# Patient Record
Sex: FEMALE | Race: WHITE | NOT HISPANIC OR LATINO | Employment: FULL TIME
[De-identification: names, ages, dates, MRNs, and addresses within clinical notes are randomized per-mention and may not be internally consistent; named-entity substitution may affect disease eponyms.]

---

## 2018-01-05 ENCOUNTER — RECORDS - HEALTHEAST (OUTPATIENT)
Dept: ADMINISTRATIVE | Facility: OTHER | Age: 33
End: 2018-01-05

## 2018-04-05 ENCOUNTER — RECORDS - HEALTHEAST (OUTPATIENT)
Dept: LAB | Facility: CLINIC | Age: 33
End: 2018-04-05

## 2018-04-05 LAB — HIV 1+2 AB+HIV1 P24 AG SERPL QL IA: NEGATIVE

## 2018-04-06 LAB
HBV SURFACE AG SERPL QL IA: NEGATIVE
HCV AB SERPL QL IA: NEGATIVE
HEPATITIS B SURFACE ANTIBODY LHE- HISTORICAL: POSITIVE

## 2018-05-29 ENCOUNTER — RECORDS - HEALTHEAST (OUTPATIENT)
Dept: ADMINISTRATIVE | Facility: OTHER | Age: 33
End: 2018-05-29

## 2018-05-29 ENCOUNTER — AMBULATORY - HEALTHEAST (OUTPATIENT)
Dept: LAB | Facility: CLINIC | Age: 33
End: 2018-05-29

## 2018-05-29 DIAGNOSIS — O99.810 ABNORMAL MATERNAL GLUCOSE TOLERANCE, ANTEPARTUM: ICD-10-CM

## 2018-05-29 LAB
FASTING STATUS PATIENT QL REPORTED: YES
GLUCOSE 1H P 100 G GLC PO SERPL-MCNC: 188 MG/DL (ref 70–179)
GLUCOSE 2H P 100 G GLC PO SERPL-MCNC: 160 MG/DL (ref 70–154)
GLUCOSE 3H P 100 G GLC PO SERPL-MCNC: 87 MG/DL (ref 70–139)
GLUCOSE P FAST SERPL-MCNC: 80 MG/DL (ref 70–94)

## 2018-05-30 ENCOUNTER — RECORDS - HEALTHEAST (OUTPATIENT)
Dept: ADMINISTRATIVE | Facility: OTHER | Age: 33
End: 2018-05-30

## 2018-05-30 ENCOUNTER — COMMUNICATION - HEALTHEAST (OUTPATIENT)
Dept: ADMINISTRATIVE | Facility: CLINIC | Age: 33
End: 2018-05-30

## 2018-06-07 ENCOUNTER — AMBULATORY - HEALTHEAST (OUTPATIENT)
Dept: EDUCATION SERVICES | Facility: CLINIC | Age: 33
End: 2018-06-07

## 2018-06-07 DIAGNOSIS — O24.419 DM (DIABETES MELLITUS), GESTATIONAL, ANTEPARTUM: ICD-10-CM

## 2018-06-07 DIAGNOSIS — O24.419 GESTATIONAL DIABETES MELLITUS (GDM), ANTEPARTUM, GESTATIONAL DIABETES METHOD OF CONTROL UNSPECIFIED: ICD-10-CM

## 2018-06-08 ENCOUNTER — COMMUNICATION - HEALTHEAST (OUTPATIENT)
Dept: ADMINISTRATIVE | Facility: CLINIC | Age: 33
End: 2018-06-08

## 2018-06-08 DIAGNOSIS — O24.419 GESTATIONAL DIABETES MELLITUS (GDM), ANTEPARTUM, GESTATIONAL DIABETES METHOD OF CONTROL UNSPECIFIED: ICD-10-CM

## 2018-06-14 ENCOUNTER — AMBULATORY - HEALTHEAST (OUTPATIENT)
Dept: EDUCATION SERVICES | Facility: CLINIC | Age: 33
End: 2018-06-14

## 2018-06-14 DIAGNOSIS — O24.410 DIET CONTROLLED GESTATIONAL DIABETES MELLITUS (GDM), ANTEPARTUM: ICD-10-CM

## 2018-07-05 ENCOUNTER — SURGERY - HEALTHEAST (OUTPATIENT)
Dept: OBGYN | Facility: HOSPITAL | Age: 33
End: 2018-07-05

## 2018-07-05 ENCOUNTER — AMBULATORY - HEALTHEAST (OUTPATIENT)
Dept: EDUCATION SERVICES | Facility: CLINIC | Age: 33
End: 2018-07-05

## 2018-07-05 ENCOUNTER — ANESTHESIA - HEALTHEAST (OUTPATIENT)
Dept: OBGYN | Facility: HOSPITAL | Age: 33
End: 2018-07-05

## 2018-07-05 DIAGNOSIS — O24.410 DIET CONTROLLED GESTATIONAL DIABETES MELLITUS (GDM), ANTEPARTUM: ICD-10-CM

## 2018-07-05 ASSESSMENT — MIFFLIN-ST. JEOR: SCORE: 1572.25

## 2018-07-06 ENCOUNTER — HOME CARE/HOSPICE - HEALTHEAST (OUTPATIENT)
Dept: HOME HEALTH SERVICES | Facility: HOME HEALTH | Age: 33
End: 2018-07-06

## 2020-01-10 LAB
HBV SURFACE AG SERPL QL IA: NEGATIVE
HIV 1+2 AB+HIV1 P24 AG SERPL QL IA: NEGATIVE
RUBELLA ANTIBODY IGG QUANTITATIVE: NORMAL IU/ML

## 2020-04-02 ENCOUNTER — PRE VISIT (OUTPATIENT)
Dept: MATERNAL FETAL MEDICINE | Facility: CLINIC | Age: 35
End: 2020-04-02

## 2020-04-02 ENCOUNTER — COMMUNICATION - HEALTHEAST (OUTPATIENT)
Dept: MATERNAL FETAL MEDICINE | Facility: HOSPITAL | Age: 35
End: 2020-04-02

## 2020-04-02 ENCOUNTER — TRANSCRIBE ORDERS (OUTPATIENT)
Dept: MATERNAL FETAL MEDICINE | Facility: CLINIC | Age: 35
End: 2020-04-02

## 2020-04-02 ENCOUNTER — MEDICAL CORRESPONDENCE (OUTPATIENT)
Dept: HEALTH INFORMATION MANAGEMENT | Facility: CLINIC | Age: 35
End: 2020-04-02

## 2020-04-02 ENCOUNTER — AMBULATORY - HEALTHEAST (OUTPATIENT)
Dept: MATERNAL FETAL MEDICINE | Facility: HOSPITAL | Age: 35
End: 2020-04-02

## 2020-04-02 DIAGNOSIS — O26.90 PREGNANCY RELATED CONDITION, ANTEPARTUM: Primary | ICD-10-CM

## 2020-04-02 DIAGNOSIS — O26.90 PREGNANCY, ANTEPARTUM, COMPLICATIONS: ICD-10-CM

## 2020-04-03 ENCOUNTER — OFFICE VISIT (OUTPATIENT)
Dept: MATERNAL FETAL MEDICINE | Facility: CLINIC | Age: 35
End: 2020-04-03
Attending: OBSTETRICS & GYNECOLOGY
Payer: COMMERCIAL

## 2020-04-03 ENCOUNTER — HOSPITAL ENCOUNTER (OUTPATIENT)
Dept: ULTRASOUND IMAGING | Facility: CLINIC | Age: 35
End: 2020-04-03
Attending: OBSTETRICS & GYNECOLOGY
Payer: COMMERCIAL

## 2020-04-03 DIAGNOSIS — O28.3 ABNORMAL PRENATAL ULTRASOUND: Primary | ICD-10-CM

## 2020-04-03 DIAGNOSIS — O28.3 ABNORMAL PRENATAL ULTRASOUND: ICD-10-CM

## 2020-04-03 DIAGNOSIS — O26.90 PREGNANCY RELATED CONDITION, ANTEPARTUM: ICD-10-CM

## 2020-04-03 LAB
MISCELLANEOUS TEST: NORMAL
MISCELLANEOUS TEST: NORMAL

## 2020-04-03 PROCEDURE — 40001082 ZZHCL STATISTIC MATERNAL CELL CONTAM MOLEC: Performed by: OBSTETRICS & GYNECOLOGY

## 2020-04-03 PROCEDURE — 87529 HSV DNA AMP PROBE: CPT | Performed by: OBSTETRICS & GYNECOLOGY

## 2020-04-03 PROCEDURE — 76946 ECHO GUIDE FOR AMNIOCENTESIS: CPT | Performed by: OBSTETRICS & GYNECOLOGY

## 2020-04-03 PROCEDURE — 87798 DETECT AGENT NOS DNA AMP: CPT | Mod: XU | Performed by: OBSTETRICS & GYNECOLOGY

## 2020-04-03 PROCEDURE — 76811 OB US DETAILED SNGL FETUS: CPT

## 2020-04-03 PROCEDURE — 96040 ZZH GENETIC COUNSELING, EACH 30 MINUTES: CPT | Mod: ZF | Performed by: GENETIC COUNSELOR, MS

## 2020-04-03 PROCEDURE — 36415 COLL VENOUS BLD VENIPUNCTURE: CPT | Mod: ZF

## 2020-04-03 PROCEDURE — 81265 STR MARKERS SPECIMEN ANAL: CPT | Mod: XU | Performed by: OBSTETRICS & GYNECOLOGY

## 2020-04-03 PROCEDURE — 81229 CYTOG ALYS CHRML ABNR SNPCGH: CPT | Performed by: OBSTETRICS & GYNECOLOGY

## 2020-04-03 PROCEDURE — 84999 UNLISTED CHEMISTRY PROCEDURE: CPT | Performed by: OBSTETRICS & GYNECOLOGY

## 2020-04-03 NOTE — PROGRESS NOTES
Please refer to ultrasound report under 'Imaging' Studies of 'Chart Review' tabs.    Wilain Pathak M.D.

## 2020-04-03 NOTE — PROGRESS NOTES
Pt here for amniocentesis d/t abnormal ultrasound findings. Saw [facility], see their dictation.  After consent signed and TimeOut completed, Dr. Pathak withdrew adequate fluid xone transabdominal pass.    Patient reports no pain.  Pt is RH positive.  MD reviewed blood type and screen and Rhogam not indicated. Maternal cell contamination blood drawn in [facility] clinic.  Discharge teaching completed and questions answered.  Pt discharged ambulatory and stable.   Lab alerted by calling phone number on amnio work-aid for [facility].  Cytogenetics notified of specimen.  Specimen transported to main lab by ALIA Iqbal.  Warm hand-off completed.

## 2020-04-03 NOTE — PROGRESS NOTES
[facility]  Genetic Counseling Consult    Patient: Payal Nunes YOB: 1985   Date of Service:  20      Payal Nunes was seen at the [facility] for genetic consultation given abnormal ultrasound findings.      Impression/Plan:   1. 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  Please see ultrasound for complete details.      2. After today's ultrasound Payal met with genetic counseling to coordinate amniocentesis. The following testing was ordered on the prenatal sample: Fetal Microarray and infection studies (HSV, CMV, Toxo).  Results are expected within 7-10 days, and will be available in [employer]. We will contact her to discuss the results, and a copy will be forwarded to the office of the referring OB provider.  Payal will be contacted at the number she provided, [phone], and requests that detailed results be left in her voicemail if she cannot be reached.     Pregnancy History:   /Parity:                            Age at Delivery: 35 year old  BEN: 2020, by Last Menstrual Period                  Gestational Age: 24w0d  -  No significant complications or exposures were reported in the current pregnancy.    Medical History:   Payal s reported medical history is not expected to impact pregnancy management or risks to fetal development.       Risk Assessment:   We explained that the risk for fetal chromosome abnormalities increases with maternal age. We discussed specific features of common chromosome abnormalities, including Down syndrome, trisomy 13, trisomy 18, and sex chromosome trisomies.      - At age 34 at midtrimester, the risk to have a baby with Down syndrome is 1 in 508.     - At age 34 at midtrimester, the risk to have a baby with any chromosome abnormality is 1 in 254.     -  The patient had a Non-Invasive Prenatal Test (NIPT) earlier in  pregnancy.  The results of which were screen negative, significantly reducing the risks for these more common aneuploidies.      After today's ultrasound noted multiple central nervous system anomalies genetic counseling met with Payal to review the findings and discuss testing options moving forward.  A majority of time today was spent providing emotional support. We discussed the uncertainty that surrounds this type of ultrasound finding, as the spectrum of prognoses for these findings is very broad, and can range from very mild impacts to more profound developmental concerns.  We discussed that imaging during pregnancy may be able to provide additional information, but may not be able to provide a definitive prognosis for the pregnancy.      Payal expressed interest in gathering as much information as possible and we discussed genetic testing as one means of further testing.   Payal understands that genetic testing may or may not provide additional information regarding prognosis for the pregnancy, and that negative genetic testing doesn't exclude the possibility of an underlying genetic condition.      Appropriate consent was obtained for fetal microarray and amniocentesis, as well as infection studies on amniotic fluid.  See impression/plan section for details.        It was a pleasure to be involved with Payal s care. Face-to-face time of the meeting was 25 minutes.  Evert Obrien MS, [facility]  Licensed Genetic Counselor  Phone: [phone]  Pager: [phone]

## 2020-04-05 LAB
RESULT: NORMAL
SEND OUTS MISC TEST CODE: NORMAL
SEND OUTS MISC TEST SPECIMEN: NORMAL
TEST NAME: NORMAL

## 2020-04-06 LAB — LAB SCANNED RESULT: NORMAL

## 2020-04-10 ENCOUNTER — HOSPITAL ENCOUNTER (OUTPATIENT)
Dept: CARDIOLOGY | Facility: CLINIC | Age: 35
Discharge: HOME | End: 2020-04-10
Attending: OBSTETRICS & GYNECOLOGY | Admitting: OBSTETRICS & GYNECOLOGY
Payer: COMMERCIAL

## 2020-04-10 ENCOUNTER — OFFICE VISIT (OUTPATIENT)
Dept: CARDIOLOGY | Facility: CLINIC | Age: 35
End: 2020-04-10

## 2020-04-10 DIAGNOSIS — O35.BXX0 ANOMALY OF HEART OF FETUS AFFECTING PREGNANCY, ANTEPARTUM, SINGLE OR UNSPECIFIED FETUS: Primary | ICD-10-CM

## 2020-04-10 PROCEDURE — 93325 DOPPLER ECHO COLOR FLOW MAPG: CPT

## 2020-04-14 ENCOUNTER — TELEPHONE (OUTPATIENT)
Dept: MATERNAL FETAL MEDICINE | Facility: CLINIC | Age: 35
End: 2020-04-14

## 2020-04-14 LAB
CHROM ANALY RESULT (ISCN): NORMAL
PATHOLOGY STUDY: NORMAL
SERVICE CMNT-IMP: YES
SPECIMEN SOURCE: NORMAL

## 2020-04-14 NOTE — TELEPHONE ENCOUNTER
4/14/2020    Called Payal to discuss results from her amniocentesis.  Microarray results are normal, female pattern, indicating no clinically significant additions or duplications of genetic material.  Infection studies are negative for HSV, Toxo, and CMV as well.  We discussed that this is a reassuring result, but does not provide any specific reason for the ultrasound findings.  Payal had several questions regarding delivery plans and recommendations, and asked that this be addressed at her next [facility] ultrasound, which is scheduled for 5/1/2020.  All of Payal's questions were answered to her satisfaction at this time and Payal was encouraged to remain in contact with genetic counseling as needed moving forward.     Evert Obrien MS, [facility]  Licensed Genetic Counselor  Phone: [phone]  Pager: [phone]

## 2020-05-01 ENCOUNTER — OFFICE VISIT (OUTPATIENT)
Dept: MATERNAL FETAL MEDICINE | Facility: CLINIC | Age: 35
End: 2020-05-01
Attending: OBSTETRICS & GYNECOLOGY
Payer: COMMERCIAL

## 2020-05-01 ENCOUNTER — HOSPITAL ENCOUNTER (OUTPATIENT)
Dept: ULTRASOUND IMAGING | Facility: CLINIC | Age: 35
End: 2020-05-01
Attending: OBSTETRICS & GYNECOLOGY
Payer: COMMERCIAL

## 2020-05-01 PROCEDURE — 76816 OB US FOLLOW-UP PER FETUS: CPT

## 2020-05-01 NOTE — PROGRESS NOTES
"Please see \"Imaging\" tab under \"Chart Review\" for details of today's US at the [facility].    Dong Pate MD  Maternal-Fetal Medicine      "

## 2020-05-07 ENCOUNTER — AMBULATORY - HEALTHEAST (OUTPATIENT)
Dept: LAB | Facility: CLINIC | Age: 35
End: 2020-05-07

## 2020-05-07 DIAGNOSIS — O99.810 ABNORMAL MATERNAL GLUCOSE TOLERANCE, ANTEPARTUM: ICD-10-CM

## 2020-05-07 LAB
FASTING STATUS PATIENT QL REPORTED: YES
GLUCOSE 1H P 100 G GLC PO SERPL-MCNC: 188 MG/DL (ref 70–179)
GLUCOSE 2H P 100 G GLC PO SERPL-MCNC: 152 MG/DL (ref 70–154)
GLUCOSE 3H P 100 G GLC PO SERPL-MCNC: 87 MG/DL (ref 70–139)
GLUCOSE P FAST SERPL-MCNC: 80 MG/DL (ref 70–94)

## 2020-05-15 ENCOUNTER — TRANSFERRED RECORDS (OUTPATIENT)
Dept: HEALTH INFORMATION MANAGEMENT | Facility: CLINIC | Age: 35
End: 2020-05-15

## 2020-05-19 ENCOUNTER — TELEPHONE (OUTPATIENT)
Dept: MATERNAL FETAL MEDICINE | Facility: CLINIC | Age: 35
End: 2020-05-19

## 2020-05-19 NOTE — TELEPHONE ENCOUNTER
Received call from Payal wondering whether she needs a pediatric neurology consult following fetal MRI scheduled for 6/1. Confirmed with Dr. Garcia, need for consult will depend on MRI results and will likely be deferred until after delivery. Pt VU. Reviewed time/location of appts 6/1, pt aware.

## 2020-06-01 ENCOUNTER — HOSPITAL ENCOUNTER (OUTPATIENT)
Dept: MRI IMAGING | Facility: CLINIC | Age: 35
End: 2020-06-01
Attending: OBSTETRICS & GYNECOLOGY
Payer: COMMERCIAL

## 2020-06-01 ENCOUNTER — OFFICE VISIT (OUTPATIENT)
Dept: MATERNAL FETAL MEDICINE | Facility: CLINIC | Age: 35
End: 2020-06-01
Attending: OBSTETRICS & GYNECOLOGY
Payer: COMMERCIAL

## 2020-06-01 ENCOUNTER — HOSPITAL ENCOUNTER (OUTPATIENT)
Dept: ULTRASOUND IMAGING | Facility: CLINIC | Age: 35
End: 2020-06-01
Attending: OBSTETRICS & GYNECOLOGY
Payer: COMMERCIAL

## 2020-06-01 PROCEDURE — 74712 MRI FETAL SNGL/1ST GESTATION: CPT

## 2020-06-01 PROCEDURE — 76816 OB US FOLLOW-UP PER FETUS: CPT

## 2020-06-01 NOTE — NURSING NOTE
Payal presents to [facility] for follow up growth US and to review fetal MRI results due to fetal CNS abnormality. Pt will follow up with growth US in 4 week. Will discuss case at [facility] on 6/4 to help determine best location for delivery and follow plan for infant.      Mili Fitzgerald RN

## 2020-06-01 NOTE — PROGRESS NOTES
"Please see \"Imaging\" tab under \"Chart Review\" for details of today's US at the [facility].    Dong Pate MD  Maternal-Fetal Medicine      "

## 2020-06-04 ENCOUNTER — TELEPHONE (OUTPATIENT)
Dept: MATERNAL FETAL MEDICINE | Facility: CLINIC | Age: 35
End: 2020-06-04

## 2020-06-04 NOTE — TELEPHONE ENCOUNTER
Phone call to Payal after [facility] monthly meeting this morning with recommendation for delivery at [facility] due to fetal CNS abnormality.  NICU virtual consult scheduled for Monday 6/8 at 0900. Writer will email information to Payal. Visit on 6/26 moved to 6/29 Rl2 with [facility] OB visit.    Mili Fitzgerald RN

## 2020-06-08 ENCOUNTER — VIRTUAL VISIT (OUTPATIENT)
Dept: MATERNAL FETAL MEDICINE | Facility: CLINIC | Age: 35
End: 2020-06-08
Attending: OBSTETRICS & GYNECOLOGY
Payer: COMMERCIAL

## 2020-06-08 NOTE — PROGRESS NOTES
"Payal Nunes is a 34 year old female who is being evaluated via a billable video visit.      The patient has been notified of following:     \"This video visit will be conducted via a call between you and your physician/provider. We have found that certain health care needs can be provided without the need for an in-person physical exam.  This service lets us provide the care you need with a video conversation.  If a prescription is necessary we can send it directly to your pharmacy.  If lab work is needed we can place an order for that and you can then stop by our lab to have the test done at a later time.    Video visits are billed at different rates depending on your insurance coverage.  Please reach out to your insurance provider with any questions.    If during the course of the call the physician/provider feels a video visit is not appropriate, you will not be charged for this service.\"    Patient has given verbal consent for Video visit? Yes    How would you like to obtain your AVS? N/A    Patient would like the video invitation sent by: Text to cell phone: [phone]    Will anyone else be joining your video visit? No  {If patient encounters technical issues they should call [phone]      Video-Visit Details    Type of service:  Video Visit    Distant Location (provider location):  [place] [facility]ER[facility]IC[facility]     Platform used for Video Visit: Delta    Neonatology Consultation  I had the opportunity to meet with Ms. Payal Nunes and her partner Nithin nazario for neonatology consultation at the request of Dr. Garcia in [facility].  Ms Nunes is currently 34 weeks pregnant with a fetus affected by absent corpus callosum and colpocephalic ventriculomegaly that has been moderate on ultrasound.  She plans delivery at [facility] at this time.    We discussed the NICU team that will be present at delivery.  We discussed the provision of cardiorespiratory support if needed.  We also discussed the difficulty in " predicting the degree of support (if any) that their infant will need.  If the ventriculomegaly and head size are mild or moderate, there is a possibility that interventions in the  period may be minimal.  We did discuss that a post lillian head ultrasound and probable MRI would be indicated.  Based on these findings, then neurosurgery and/or neurology consultation may be required.  She has already had a normal amniocentesis with XX female, normal microarray.      We discussed the layers of support that will be present in the NICU is a stay is required.  We reviewed COVID visiting restrictions and encouraged self quarantine in the weeks leading to delivery.  All questions were answered at the completion of the visit.  We look forward to caring for the infant daughter of Ms. Payal Nunes at [facility].  Please contact me if there are additional questions.   Kassandra Sanches MD  Total time of visit: 20 mins with 100% of time in direct patient counseling.   Kassandra Sanches MD

## 2020-06-09 ENCOUNTER — TELEPHONE (OUTPATIENT)
Dept: CARE COORDINATION | Facility: CLINIC | Age: 35
End: 2020-06-09

## 2020-06-09 NOTE — TELEPHONE ENCOUNTER
[facility]ILDR[facility]  MATERNAL CHILD HEALTH SOCIAL WORK INITIAL [facility] NICU CONSULT    DATA:     Spoke with pt, Payal Nunes ( 1985), this morning to introduce services and complete initial psychosocial assessment following her [facility] NICU consult yesterday, 2020. Pt is currently 33w3d weeks pregnant with a baby girl, who couple have named May. FOB/spouse is Nithin. Couple are also parents to a almost 3 y/o son, Adair (born 2018). Of note, their son was born at 34w6d gestation and admitted to [facility] NICU for 8 days after birth.    Fetal diagnoses:   1) absent CSP/corpus callosum  2) colpocephaly    Pertinent maternal conditions:  1) Hx PPROM at 35w, VAVD w/PPH  2) + anti K antibody  3) Hx diet controlled GDM in prior preg  4) BMI 30    Family currently resides in [place], [place]. Pt reports having a robust, engaged social support network. She reports family have reliable access to personal transportation.    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 Due to the COVID-19 pandemic, pt has been placed on furlough by her employer. She will remain on furlough until delivery. At this time, she is receiving COVID unemployment benefits. When baby is born, pt will take a 12-week paid maternity leave through [place]. She has already had her provider complete her FMLA documents. Pt denied having any current stressors related to coordinating her leave.    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 Due to COVID-19, VEENA is currently furloughed. He too is receiving COVID unemployment benefits. When the refinery reopens, VEENA will return to work. Pt reports VEENA has a supportive employer and reports he will be able to take days off as needed when baby is born and he is back to work.    Family told SW that they have all necessary baby supplies ready at home. Family plans for baby to be insured through FOBs  employer insurance.    Pt denies a significant mental health history. She endorses stable mood now and throughout the pregnancy.     INTERVENTION:       SW completed chart review and collaborated with the multidisciplinary team.    Introduction to Maternal Child Health  role and scope of practice.    Shared SW contact information with mother via e-mail ([email or web address]).    SW spoke with pt via telephone following M NICU consult to assess for needs, offer support, and assess for coping.     Provided supportive counseling, active empathic listening and validation of emotions.     Reassured family of ongoing SW supportive services available to them at the hospital. Encouraged parents to access this SW for support as needed.    ASSESSMENT:     Family appears to be coping adequately at this time. They appear to have good coping skills and social supports. Pt easily engaged in conversation with SW this morning and she seems able to verbally express herself and identify needs. Parents are aware of social work support and availability. No un-met needs or concerns identified at this time.     PLAN:     SW will continue to assess needs and provide ongoing psychosocial support and access to resources. DAVID will continue to collaborate with the multidisciplinary team.    ANGELITA Lisa, [facility]  Clinical   Maternal Child Health  [facility]  Phone:   [phone]  Pager:    [phone]

## 2020-06-12 ENCOUNTER — TRANSFERRED RECORDS (OUTPATIENT)
Dept: HEALTH INFORMATION MANAGEMENT | Facility: CLINIC | Age: 35
End: 2020-06-12

## 2020-06-25 NOTE — PROGRESS NOTES
Maternal-Fetal Medicine   First OB Visit    Payal Nunes  : 1985  MRN: [id number]    Payal Nunes is a transfer of care visit from [facility]    HPI:  Payal Nunes is a 34 year old  at 36w3d by LMP consistent with 5wk US here for new OB transfer of care visit d/t fetal CNS anomaly.    Today she states that she has been feeling well. She denies any vaginal bleeding, leakage of fluid, or contractions. She confirms active fetal movement. She denies any other systemic symptoms.      Obstetrics History:  OB History    Para Term  AB Living   2 1 0 1 0 1   SAB TAB Ectopic Multiple Live Births   0 0 0 0 1      # Outcome Date GA Lbr Servando/2nd Weight Sex Delivery Anes PTL Lv   2 Current            1  18    M Vag-Vacuum   SALLY       Gynecologic History:  - Denies any history of abnormal pap smears  - Denies prior cervical surgery or procedures  - Denies any history of frequent UTIs, vaginal infections, or STIs    Past Medical History:  No past medical history on file.    Past Surgical History:  No past surgical history on file.    Current Medications:  Prior to Admission medications    Medication Sig Last Dose Taking? Auth Provider   Prenatal Vit-Fe Fumarate-FA (PRENATAL VITAMIN PO) Take 1 tablet by mouth daily   Reported, Patient       Allergies:  Patient has no known allergies.    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    Family History:  Denies history of genetic disorders, preeeclampsia, thromboembolic disease, bleeding disorders, mental retardation    ROS:  10-point ROS negative except as in HPI     PHYSICAL EXAM:  LMP 10/18/2019     Gen: NAD, well appearing  Chest: Normal rate  Pulm: Normal respiratory effrot  Abdomen: Gravid, non-tender  Extremities: no edema    ASSESSMENT/PLAN:  Payal Nunes is a 34 year old  at 36w3d by LMP consistent with 5wk US here for  "new OB transfer of care visit d/t fetal CNS anomaly.    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 Findings consistent on fetal MRI performed on .  - S/p amniocentesis, which demonstrated normal karyotype and microarray.   - Infectious testing for HSV, toxo, and CMV were negative  - Growth today appropriate with EFW 2,741 g/6Ib 1oz (34%ile).  - S/p Fetal echo on 4/10 was within normal limits.  - S/p NICU consult with Dr. Sanches on   - S/p Social work consult on   - Plan for neurology consultation postnatally    # Anti-K antibody  - Too weak to titer on 1/10/2020.  Repeat testing negative on 3/6/2020.  - Repeat T&S on admission    # History of postpartum hemorrhage   # History of  delivery after PPROM  # History of VAVD  - Prior  vacuum assisted vaginal delivery complicated by postpartum hemorrhage. Hemorrhage of approximately 1 liter, received 1 unit of blood, and required D&C for management of retained placenta.  - Recommend Type & Cross on admission as well as uterotonics in the room for delivery    # Prenatal care:  - Prenatal labs:     - B positive, Ab screen negative, Hgb 15.0, Rubella immune, VDRL NR, HBsAg neg, HIV neg, Hgb A1c 5.0%, UCx negative   - Failed GCT (141). Passed GTT.  History of GDMA1   - GBS collected . Will follow up on results  - Genetics: NIPT negative, AFP negative. Normal karyotype and microarray from amniocentesis  - Immunizations: S/p flu, Tdap    # Delivery Plan:  - S/p betamethasone at outside hospital (-). SVE 1 cm.   - Plan for induction of labor at 39 weeks gestation, tentatively . Head circumference within normal limits, no contraindication to vaginal delivery. Will plan Covid testing as date approaches.    I acted as a scribe for Dr. Herlinda Wilkes MD  Maternal Fetal Medicine Fellow  2020 8:38 AM      Please see \"Imaging\" tab under \"Chart Review\" " for details of today's US at the [facility].    Physician Attestation   I, Dong Pate MD, saw this patient and agree with the findings and plan of care as documented in the note.      Items personally reviewed/procedural attestation: History, vital signs, examination, ultrasound and plan of care.    Dong Pate MD

## 2020-06-26 LAB — GROUP B STREP PCR: NEGATIVE

## 2020-06-27 ENCOUNTER — HOSPITAL ENCOUNTER (OUTPATIENT)
Dept: OBGYN | Facility: HOSPITAL | Age: 35
Discharge: HOME | End: 2020-06-27
Attending: OBSTETRICS & GYNECOLOGY | Admitting: OBSTETRICS & GYNECOLOGY

## 2020-06-28 ENCOUNTER — TRANSFERRED RECORDS (OUTPATIENT)
Dept: HEALTH INFORMATION MANAGEMENT | Facility: CLINIC | Age: 35
End: 2020-06-28

## 2020-06-29 ENCOUNTER — HOSPITAL ENCOUNTER (OUTPATIENT)
Dept: ULTRASOUND IMAGING | Facility: CLINIC | Age: 35
End: 2020-06-29
Attending: OBSTETRICS & GYNECOLOGY
Payer: COMMERCIAL

## 2020-06-29 ENCOUNTER — OFFICE VISIT (OUTPATIENT)
Dept: MATERNAL FETAL MEDICINE | Facility: CLINIC | Age: 35
End: 2020-06-29
Attending: OBSTETRICS & GYNECOLOGY
Payer: COMMERCIAL

## 2020-06-29 VITALS
HEART RATE: 109 BPM | SYSTOLIC BLOOD PRESSURE: 131 MMHG | HEIGHT: 66 IN | BODY MASS INDEX: 33.19 KG/M2 | OXYGEN SATURATION: 96 % | RESPIRATION RATE: 18 BRPM | WEIGHT: 206.5 LBS | DIASTOLIC BLOOD PRESSURE: 83 MMHG

## 2020-06-29 DIAGNOSIS — Z34.90 ENCOUNTER FOR PLANNED INDUCTION OF LABOR: Primary | ICD-10-CM

## 2020-06-29 PROCEDURE — 76816 OB US FOLLOW-UP PER FETUS: CPT

## 2020-06-29 PROCEDURE — G0463 HOSPITAL OUTPT CLINIC VISIT: HCPCS | Mod: 25,ZF

## 2020-06-29 ASSESSMENT — PAIN SCALES - GENERAL: PAINLEVEL: NO PAIN (0)

## 2020-06-29 ASSESSMENT — MIFFLIN-ST. JEOR: SCORE: 1655.49

## 2020-06-29 NOTE — NURSING NOTE
Payal seen in clinic today for follow up growth ultrasound and [facility] OB visit at 36w3d gestation due to pregnancy c/b fetal CNS abnormality and plans for delivery at [facility] (see report/notes). VSS. Pt reports + fetal movement. Pt denies bldg/lof/change in discharge/contractions/headache/vision changes/chest pain/SOB/edema. Pt states GBS was collected on Friday 6/26 at [facility]. States her cervix was 1 cm then also and she was given betamethasone on 6/26 and 6/27 due to hx PTD. Dr. Wilkes and Dr. Pate met with pt and discussed POC. Plan to weekly OB visits with IOL on 7/20 at 39w3d.  L&D and NICU aware. Pt discharged stable and ambulatory.     Mili Fitzgerald RN

## 2020-07-01 ENCOUNTER — TELEPHONE (OUTPATIENT)
Dept: MATERNAL FETAL MEDICINE | Facility: CLINIC | Age: 35
End: 2020-07-01

## 2020-07-01 NOTE — TELEPHONE ENCOUNTER
Sam Moe,     It's good to hear from you and I hope you are doing well as you close in on your due date.  I'm sorry that this question of choanal atresia is catching you off guard right now.  Choanal atresia means that there is a blockage in the back of the sinus, and can be caused by bony tissue or soft membranes developing differently within the sinus cavity.  But the MRI for your pregnancy didn't state that this is definitely present, only that they couldn't rule it out.  MRI assesses for this by looking for fluid passing through the sinuses during the scan, and for the MRI, it did not clearly see fluid within the left sinus, so it cannot rule out choanal atresia on that side.  A physical exam after birth will be able to rule this out or confirm it.      Most cases of choanal atresia are random or sporadic, but you are correct that it can be seen as one of the symptoms of CHARGE syndrome.  However, people with CHARGE syndrome typically have other very particular physical differences that could be seen on prenatal imaging as well, in particular congenital heart defects, cleft lip or palate, defects involving the trachea/esophagus, and differences in how the genitals develop.  All of the scans for your pregnancy thus far have been completely normal for these concerns.  Other symptoms of CHARGE syndrome can't be assessed prenatally, but would be evaluated on routine exams after delivery.  These include things like hearing tests and eye exams, and a very particular shape to the ears.  The genetic testing done so far did not assess for the genetic change that causes CHARGE syndrome, so it cannot be completely ruled out, but it is reassuring that non of the other symptoms that can be detected prenatally have been seen.  After delivery if baby is showing any other signs or symptoms, testing for CHARGE syndrome would certainly be discussed as an option, but right now it is probably not a likely explanation.      I'm  sorry that this is a stressful time right now, and I'm sure you would rather be focusing on other things.  Please let me know if this helps, or if you have any other questions that you want answered as you get closer to your due date.      Evert Obrien MS, [facility]      Licensed Genetic Counselor   [facility]  [email or web address]  [email or web address]   Office: [phone]   Pager: [phone]  Fax: [phone]

## 2020-07-01 NOTE — TELEPHONE ENCOUNTER
Sam Espinoza,    It has been a while. I had the mri at 32 weeks and now almost 37 weeks, I was told no other abnormalities on mri. They report just came through on my chart today and now I see it says question of left choanal atresia- of course now I m freaking out a little because it says can be associated with CHARGE syndrome and a few others but most commonly charge. Is this something for cause to worry or did any of the genetic testing I did rule this out? Have been in a good place with acc looking Isolated and so close to delivery, my  says I should not worry if they did not see the need to mention it.    Thanks.  Payal Nunes

## 2020-07-02 ENCOUNTER — TELEPHONE (OUTPATIENT)
Dept: MATERNAL FETAL MEDICINE | Facility: CLINIC | Age: 35
End: 2020-07-02

## 2020-07-02 NOTE — TELEPHONE ENCOUNTER
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 Writer spoke with Dr. Pate who reviewed MRI with Ms. Nunes and he assured this impression is not worrisome at this time, it is a suspicion not a diagnosis and with it being unilateral, the finding is most likely a deviated septum. This information was reviewed with pt and she was reassured with the information. Writer assured pt we can discuss more at her visit scheduled on 7/6 if any other questions or concerns arise. Payal appreciative and VU.   Elinor Ward RN    "

## 2020-07-06 ENCOUNTER — HOSPITAL ENCOUNTER (INPATIENT)
Facility: CLINIC | Age: 35
LOS: 1 days | Discharge: HOME | End: 2020-07-08
Attending: OBSTETRICS & GYNECOLOGY | Admitting: OBSTETRICS & GYNECOLOGY
Payer: COMMERCIAL

## 2020-07-06 ENCOUNTER — OFFICE VISIT (OUTPATIENT)
Dept: MATERNAL FETAL MEDICINE | Facility: CLINIC | Age: 35
End: 2020-07-06
Attending: OBSTETRICS & GYNECOLOGY
Payer: COMMERCIAL

## 2020-07-06 ENCOUNTER — ANESTHESIA (OUTPATIENT)
Dept: OBGYN | Facility: CLINIC | Age: 35
End: 2020-07-06
Payer: COMMERCIAL

## 2020-07-06 ENCOUNTER — ANESTHESIA EVENT (OUTPATIENT)
Dept: OBGYN | Facility: CLINIC | Age: 35
End: 2020-07-06
Payer: COMMERCIAL

## 2020-07-06 VITALS
DIASTOLIC BLOOD PRESSURE: 93 MMHG | OXYGEN SATURATION: 96 % | WEIGHT: 208.3 LBS | RESPIRATION RATE: 18 BRPM | SYSTOLIC BLOOD PRESSURE: 139 MMHG | HEART RATE: 117 BPM | BODY MASS INDEX: 34.14 KG/M2

## 2020-07-06 DIAGNOSIS — R03.0 ELEVATED BLOOD PRESSURE READING WITHOUT DIAGNOSIS OF HYPERTENSION: ICD-10-CM

## 2020-07-06 PROBLEM — Z36.89 ENCOUNTER FOR TRIAGE IN PREGNANT PATIENT: Status: ACTIVE | Noted: 2020-07-06

## 2020-07-06 LAB
ABO + RH BLD: ABNORMAL
ABO + RH BLD: ABNORMAL
ALT SERPL W P-5'-P-CCNC: 16 U/L (ref 0–50)
AST SERPL W P-5'-P-CCNC: 15 U/L (ref 0–45)
BASOPHILS # BLD AUTO: 0 10E9/L (ref 0–0.2)
BASOPHILS NFR BLD AUTO: 0.2 %
BLD GP AB INVEST PLASRBC-IMP: ABNORMAL
BLD GP AB SCN SERPL QL: ABNORMAL
BLD PROD TYP BPU: ABNORMAL
BLOOD BANK CMNT PATIENT-IMP: ABNORMAL
BLOOD BANK CMNT PATIENT-IMP: ABNORMAL
CREAT SERPL-MCNC: 0.48 MG/DL (ref 0.52–1.04)
CREAT UR-MCNC: 92 MG/DL
DIFFERENTIAL METHOD BLD: ABNORMAL
EOSINOPHIL # BLD AUTO: 0.1 10E9/L (ref 0–0.7)
EOSINOPHIL NFR BLD AUTO: 0.6 %
ERYTHROCYTE [DISTWIDTH] IN BLOOD BY AUTOMATED COUNT: 13.3 % (ref 10–15)
ERYTHROCYTE [DISTWIDTH] IN BLOOD BY AUTOMATED COUNT: 13.3 % (ref 10–15)
GFR SERPL CREATININE-BSD FRML MDRD: >90 ML/MIN/{1.73_M2}
HCT VFR BLD AUTO: 39.6 % (ref 35–47)
HCT VFR BLD AUTO: 41.1 % (ref 35–47)
HGB BLD-MCNC: 13.3 G/DL (ref 11.7–15.7)
HGB BLD-MCNC: 13.7 G/DL (ref 11.7–15.7)
IMM GRANULOCYTES # BLD: 0.2 10E9/L (ref 0–0.4)
IMM GRANULOCYTES NFR BLD: 1.2 %
LYMPHOCYTES # BLD AUTO: 2.4 10E9/L (ref 0.8–5.3)
LYMPHOCYTES NFR BLD AUTO: 18.8 %
MCH RBC QN AUTO: 29.8 PG (ref 26.5–33)
MCH RBC QN AUTO: 30 PG (ref 26.5–33)
MCHC RBC AUTO-ENTMCNC: 33.3 G/DL (ref 31.5–36.5)
MCHC RBC AUTO-ENTMCNC: 33.6 G/DL (ref 31.5–36.5)
MCV RBC AUTO: 89 FL (ref 78–100)
MCV RBC AUTO: 89 FL (ref 78–100)
MONOCYTES # BLD AUTO: 0.7 10E9/L (ref 0–1.3)
MONOCYTES NFR BLD AUTO: 5.7 %
NEUTROPHILS # BLD AUTO: 9.5 10E9/L (ref 1.6–8.3)
NEUTROPHILS NFR BLD AUTO: 73.5 %
NRBC # BLD AUTO: 0 10*3/UL
NRBC BLD AUTO-RTO: 0 /100
NUM BPU REQUESTED: 2
PLATELET # BLD AUTO: 202 10E9/L (ref 150–450)
PLATELET # BLD AUTO: 207 10E9/L (ref 150–450)
PROT UR-MCNC: 0.11 G/L
PROT/CREAT 24H UR: 0.12 G/G CR (ref 0–0.2)
RBC # BLD AUTO: 4.44 10E12/L (ref 3.8–5.2)
RBC # BLD AUTO: 4.6 10E12/L (ref 3.8–5.2)
SARS-COV-2 PCR COMMENT: NORMAL
SARS-COV-2 RNA SPEC QL NAA+PROBE: NEGATIVE
SARS-COV-2 RNA SPEC QL NAA+PROBE: NORMAL
SPECIMEN EXP DATE BLD: ABNORMAL
SPECIMEN SOURCE: NORMAL
SPECIMEN SOURCE: NORMAL
WBC # BLD AUTO: 11.2 10E9/L (ref 4–11)
WBC # BLD AUTO: 13 10E9/L (ref 4–11)

## 2020-07-06 PROCEDURE — 88307 TISSUE EXAM BY PATHOLOGIST: CPT | Performed by: STUDENT IN AN ORGANIZED HEALTH CARE EDUCATION/TRAINING PROGRAM

## 2020-07-06 PROCEDURE — 86850 RBC ANTIBODY SCREEN: CPT | Performed by: OBSTETRICS & GYNECOLOGY

## 2020-07-06 PROCEDURE — 82565 ASSAY OF CREATININE: CPT | Performed by: STUDENT IN AN ORGANIZED HEALTH CARE EDUCATION/TRAINING PROGRAM

## 2020-07-06 PROCEDURE — 86902 BLOOD TYPE ANTIGEN DONOR EA: CPT | Performed by: OBSTETRICS & GYNECOLOGY

## 2020-07-06 PROCEDURE — 86922 COMPATIBILITY TEST ANTIGLOB: CPT | Performed by: OBSTETRICS & GYNECOLOGY

## 2020-07-06 PROCEDURE — 25000125 ZZHC RX 250: Performed by: OBSTETRICS & GYNECOLOGY

## 2020-07-06 PROCEDURE — 25800030 ZZH RX IP 258 OP 636: Performed by: STUDENT IN AN ORGANIZED HEALTH CARE EDUCATION/TRAINING PROGRAM

## 2020-07-06 PROCEDURE — 25000125 ZZHC RX 250: Performed by: STUDENT IN AN ORGANIZED HEALTH CARE EDUCATION/TRAINING PROGRAM

## 2020-07-06 PROCEDURE — 84460 ALANINE AMINO (ALT) (SGPT): CPT | Performed by: STUDENT IN AN ORGANIZED HEALTH CARE EDUCATION/TRAINING PROGRAM

## 2020-07-06 PROCEDURE — 36415 COLL VENOUS BLD VENIPUNCTURE: CPT | Performed by: STUDENT IN AN ORGANIZED HEALTH CARE EDUCATION/TRAINING PROGRAM

## 2020-07-06 PROCEDURE — 25800030 ZZH RX IP 258 OP 636

## 2020-07-06 PROCEDURE — 72200001 ZZH LABOR CARE VAGINAL DELIVERY SINGLE

## 2020-07-06 PROCEDURE — G0463 HOSPITAL OUTPT CLINIC VISIT: HCPCS | Mod: 25,ZF

## 2020-07-06 PROCEDURE — 84450 TRANSFERASE (AST) (SGOT): CPT | Performed by: STUDENT IN AN ORGANIZED HEALTH CARE EDUCATION/TRAINING PROGRAM

## 2020-07-06 PROCEDURE — 25000128 H RX IP 250 OP 636: Performed by: ANESTHESIOLOGY

## 2020-07-06 PROCEDURE — 86870 RBC ANTIBODY IDENTIFICATION: CPT | Performed by: OBSTETRICS & GYNECOLOGY

## 2020-07-06 PROCEDURE — 25000125 ZZHC RX 250: Performed by: ANESTHESIOLOGY

## 2020-07-06 PROCEDURE — 84156 ASSAY OF PROTEIN URINE: CPT | Performed by: STUDENT IN AN ORGANIZED HEALTH CARE EDUCATION/TRAINING PROGRAM

## 2020-07-06 PROCEDURE — U0003 INFECTIOUS AGENT DETECTION BY NUCLEIC ACID (DNA OR RNA); SEVERE ACUTE RESPIRATORY SYNDROME CORONAVIRUS 2 (SARS-COV-2) (CORONAVIRUS DISEASE [COVID-19]), AMPLIFIED PROBE TECHNIQUE, MAKING USE OF HIGH THROUGHPUT TECHNOLOGIES AS DESCRIBED BY CMS-2020-01-R: HCPCS | Performed by: OBSTETRICS & GYNECOLOGY

## 2020-07-06 PROCEDURE — 86901 BLOOD TYPING SEROLOGIC RH(D): CPT | Performed by: OBSTETRICS & GYNECOLOGY

## 2020-07-06 PROCEDURE — 25000128 H RX IP 250 OP 636: Performed by: OBSTETRICS & GYNECOLOGY

## 2020-07-06 PROCEDURE — 85027 COMPLETE CBC AUTOMATED: CPT | Performed by: STUDENT IN AN ORGANIZED HEALTH CARE EDUCATION/TRAINING PROGRAM

## 2020-07-06 PROCEDURE — 85025 COMPLETE CBC W/AUTO DIFF WBC: CPT | Performed by: OBSTETRICS & GYNECOLOGY

## 2020-07-06 PROCEDURE — 86900 BLOOD TYPING SEROLOGIC ABO: CPT | Performed by: OBSTETRICS & GYNECOLOGY

## 2020-07-06 PROCEDURE — 88307 TISSUE EXAM BY PATHOLOGIST: CPT | Mod: 26 | Performed by: STUDENT IN AN ORGANIZED HEALTH CARE EDUCATION/TRAINING PROGRAM

## 2020-07-06 RX ORDER — SODIUM CHLORIDE, SODIUM LACTATE, POTASSIUM CHLORIDE, CALCIUM CHLORIDE 600; 310; 30; 20 MG/100ML; MG/100ML; MG/100ML; MG/100ML
INJECTION, SOLUTION INTRAVENOUS
Status: COMPLETED
Start: 2020-07-06 | End: 2020-07-06

## 2020-07-06 RX ORDER — NALBUPHINE HYDROCHLORIDE 20 MG/ML
2.5-5 INJECTION, SOLUTION INTRAMUSCULAR; INTRAVENOUS; SUBCUTANEOUS EVERY 6 HOURS PRN
Status: DISCONTINUED | OUTPATIENT
Start: 2020-07-06 | End: 2020-07-06

## 2020-07-06 RX ORDER — NALOXONE HYDROCHLORIDE 0.4 MG/ML
.1-.4 INJECTION, SOLUTION INTRAMUSCULAR; INTRAVENOUS; SUBCUTANEOUS
Status: DISCONTINUED | OUTPATIENT
Start: 2020-07-06 | End: 2020-07-07

## 2020-07-06 RX ORDER — LIDOCAINE HYDROCHLORIDE AND EPINEPHRINE 15; 5 MG/ML; UG/ML
INJECTION, SOLUTION EPIDURAL PRN
Status: DISCONTINUED | OUTPATIENT
Start: 2020-07-06 | End: 2020-08-10 | Stop reason: HOSPADM

## 2020-07-06 RX ORDER — OXYTOCIN/0.9 % SODIUM CHLORIDE 30/500 ML
PLASTIC BAG, INJECTION (ML) INTRAVENOUS
Status: DISCONTINUED
Start: 2020-07-06 | End: 2020-07-07 | Stop reason: HOSPADM

## 2020-07-06 RX ORDER — NALBUPHINE HYDROCHLORIDE 20 MG/ML
2.5-5 INJECTION, SOLUTION INTRAMUSCULAR; INTRAVENOUS; SUBCUTANEOUS EVERY 6 HOURS PRN
Status: DISCONTINUED | OUTPATIENT
Start: 2020-07-06 | End: 2020-07-07

## 2020-07-06 RX ORDER — EPHEDRINE SULFATE 50 MG/ML
5 INJECTION, SOLUTION INTRAMUSCULAR; INTRAVENOUS; SUBCUTANEOUS
Status: DISCONTINUED | OUTPATIENT
Start: 2020-07-06 | End: 2020-07-07

## 2020-07-06 RX ORDER — OXYTOCIN/0.9 % SODIUM CHLORIDE 30/500 ML
1-24 PLASTIC BAG, INJECTION (ML) INTRAVENOUS CONTINUOUS
Status: DISCONTINUED | OUTPATIENT
Start: 2020-07-06 | End: 2020-07-07

## 2020-07-06 RX ORDER — LIDOCAINE HYDROCHLORIDE 10 MG/ML
INJECTION, SOLUTION INFILTRATION; PERINEURAL PRN
Status: DISCONTINUED | OUTPATIENT
Start: 2020-07-06 | End: 2020-08-10 | Stop reason: HOSPADM

## 2020-07-06 RX ORDER — SODIUM CHLORIDE, SODIUM LACTATE, POTASSIUM CHLORIDE, CALCIUM CHLORIDE 600; 310; 30; 20 MG/100ML; MG/100ML; MG/100ML; MG/100ML
INJECTION, SOLUTION INTRAVENOUS
Status: DISCONTINUED
Start: 2020-07-06 | End: 2020-07-07 | Stop reason: HOSPADM

## 2020-07-06 RX ORDER — LIDOCAINE HYDROCHLORIDE 10 MG/ML
INJECTION, SOLUTION EPIDURAL; INFILTRATION; INTRACAUDAL; PERINEURAL
Status: DISCONTINUED
Start: 2020-07-06 | End: 2020-07-07 | Stop reason: WASHOUT

## 2020-07-06 RX ORDER — LIDOCAINE 40 MG/G
CREAM TOPICAL
Status: DISCONTINUED | OUTPATIENT
Start: 2020-07-06 | End: 2020-07-07

## 2020-07-06 RX ORDER — FENTANYL/BUPIVACAINE/NS/PF 2-1250MCG
PLASTIC BAG, INJECTION (ML) INJECTION
Status: DISCONTINUED
Start: 2020-07-06 | End: 2020-07-07 | Stop reason: HOSPADM

## 2020-07-06 RX ORDER — SODIUM CHLORIDE, SODIUM LACTATE, POTASSIUM CHLORIDE, CALCIUM CHLORIDE 600; 310; 30; 20 MG/100ML; MG/100ML; MG/100ML; MG/100ML
INJECTION, SOLUTION INTRAVENOUS CONTINUOUS
Status: DISCONTINUED | OUTPATIENT
Start: 2020-07-06 | End: 2020-07-07

## 2020-07-06 RX ORDER — MISOPROSTOL 200 UG/1
TABLET ORAL
Status: COMPLETED
Start: 2020-07-06 | End: 2020-07-07

## 2020-07-06 RX ORDER — OXYTOCIN 10 [USP'U]/ML
INJECTION, SOLUTION INTRAMUSCULAR; INTRAVENOUS
Status: DISCONTINUED
Start: 2020-07-06 | End: 2020-07-07 | Stop reason: WASHOUT

## 2020-07-06 RX ORDER — NALOXONE HYDROCHLORIDE 0.4 MG/ML
.1-.4 INJECTION, SOLUTION INTRAMUSCULAR; INTRAVENOUS; SUBCUTANEOUS
Status: DISCONTINUED | OUTPATIENT
Start: 2020-07-06 | End: 2020-07-06

## 2020-07-06 RX ORDER — FENTANYL CITRATE 50 UG/ML
50 INJECTION, SOLUTION INTRAMUSCULAR; INTRAVENOUS ONCE
Status: COMPLETED | OUTPATIENT
Start: 2020-07-06 | End: 2020-07-06

## 2020-07-06 RX ADMIN — Medication 10 ML: at 23:10

## 2020-07-06 RX ADMIN — OXYTOCIN-SODIUM CHLORIDE 0.9% IV SOLN 30 UNIT/500ML 340 ML/HR: 30-0.9/5 SOLUTION at 23:51

## 2020-07-06 RX ADMIN — LIDOCAINE HYDROCHLORIDE 3 ML: 10 INJECTION, SOLUTION INFILTRATION; PERINEURAL at 23:08

## 2020-07-06 RX ADMIN — Medication: at 22:51

## 2020-07-06 RX ADMIN — Medication 8 ML: at 23:13

## 2020-07-06 RX ADMIN — SODIUM CHLORIDE, POTASSIUM CHLORIDE, SODIUM LACTATE AND CALCIUM CHLORIDE 1000 ML: 600; 310; 30; 20 INJECTION, SOLUTION INTRAVENOUS at 16:39

## 2020-07-06 RX ADMIN — LIDOCAINE HYDROCHLORIDE,EPINEPHRINE BITARTRATE 3 ML: 15; .005 INJECTION, SOLUTION EPIDURAL; INFILTRATION; INTRACAUDAL; PERINEURAL at 23:10

## 2020-07-06 RX ADMIN — SODIUM CHLORIDE, POTASSIUM CHLORIDE, SODIUM LACTATE AND CALCIUM CHLORIDE: 600; 310; 30; 20 INJECTION, SOLUTION INTRAVENOUS at 22:06

## 2020-07-06 RX ADMIN — FENTANYL CITRATE 50 MCG: 50 INJECTION INTRAMUSCULAR; INTRAVENOUS at 21:59

## 2020-07-06 RX ADMIN — SODIUM CHLORIDE, POTASSIUM CHLORIDE, SODIUM LACTATE AND CALCIUM CHLORIDE: 600; 310; 30; 20 INJECTION, SOLUTION INTRAVENOUS at 21:36

## 2020-07-06 RX ADMIN — Medication 2 MILLI-UNITS/MIN: at 16:39

## 2020-07-06 ASSESSMENT — MIFFLIN-ST. JEOR: SCORE: 1654.36

## 2020-07-06 ASSESSMENT — PAIN SCALES - GENERAL: PAINLEVEL: NO PAIN (0)

## 2020-07-06 NOTE — H&P
HPI    Payal Nunes is a 34 year old  at 36w3d by LMP consistent with 5wk US here for new OB transfer of care visit d/t fetal CNS anomaly.    Was called to evaluate Ms. Enedina Nunes who was referred to triage from [facility] for elevated BP in clinic today. Today she states that she has been feeling well. She denies any vaginal bleeding, leakage of fluid, or contractions. She confirms active fetal movement. She denies any other systemic symptoms.    BP in clinic was noted to be elevated with values as high as 150/95 mmHg. On admission to triage blood pressures were slightly lower but ranged between: 120-147/75-90 mmHg. Patient reports no symptoms of headache, visual changes, epigastric pain or RUQ pain.     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 MRI confirms US findings. Raises question of unilateral choanal atresia. No other findings concerning for CHARGE Syndrome.    Assessment in triage for possible preeclampsia reported:   ROUTINE IP LABS (Last four results)  BMP  Recent Labs   Lab 20  0947   CR 0.48*     CBC  Recent Labs   Lab 20  0947   WBC 11.2*   RBC 4.60   HGB 13.7   HCT 41.1   MCV 89   MCH 29.8   MCHC 33.3   RDW 13.3        Component      Latest Ref Rng & Units 2020   AST      0 - 45 U/L 15     Component      Latest Ref Rng & Units 2020   ALT      0 - 50 U/L 16     Component      Latest Ref Rng & Units 2020   Protein Random Urine      g/L 0.11         Obstetrics History:  OB History    Para Term  AB Living   2 1 0 1 0 1   SAB TAB Ectopic Multiple Live Births   0 0 0 0 1      # Outcome Date GA Lbr Servando/2nd Weight Sex Delivery Anes PTL Lv   2 Current            1  18 34w6d  2.92 kg (6 lb 7 oz) M Vag-Vacuum   SALLY       Gynecologic History:  - Denies any history of abnormal pap smears  - Denies prior cervical surgery or procedures  - Denies any history of frequent UTIs, vaginal infections, or STIs    Past Medical  History:  Past Medical History:   Diagnosis Date     History of gestational diabetes mellitus (GDM) in prior pregnancy, currently pregnant in third trimester      Obesity (BMI 30-39.9)        Past Surgical History:  Past Surgical History:   Procedure Laterality Date     C OBSTETRICAL D&C  2018     TONSILLECTOMY         Current Medications:  Prior to Admission medications    Medication Sig Last Dose Taking? Auth Provider   Prenatal Vit-Fe Fumarate-FA (PRENATAL VITAMIN PO) Take 1 tablet by mouth daily   Reported, Patient       Allergies:  Patient has no known allergies.    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    Family History:  Denies history of genetic disorders, preeeclampsia, thromboembolic disease, bleeding disorders, mental retardation    ROS:  As noted in HPI.     PHYSICAL EXAM:  /90 mmHg. P: 86 bpm.  DTR +/+++    Gen: NAD, well appearing  Chest: Normal rate  Pulm: Normal respiratory effrot  Abdomen: Gravid, non-tender  Extremities: no edema    ASSESSMENT/PLAN:  Payal Nunes is a 34 year old  at 36w3d by LMP consistent with 5wk US here for gestational hypertension  And pregnancy complicated by fetus with agenesis of the corpus callosum.\    # Gestational hypertension:  Based on elevated BP vlaues in mild range at greater than 37 weeks without any signsa or laboratory changes consistent with preeclampsia, recommend proceeding with IOL.    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 Findings consistent on fetal MRI performed on . 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  - S/p amniocentesis,  which demonstrated normal karyotype and microarray.   - Infectious testing for HSV, toxo, and CMV were negative  - Growth today appropriate with EFW 2,741 g/6Ib 1oz (34%ile).  - S/p Fetal echo on 4/10 was within normal limits.  - S/p NICU consult with Dr. Sanches on   - S/p Social work consult on   - Plan for neurology consultation postnatally    # Anti-K antibody  - Too weak to titer on 1/10/2020.  Repeat testing negative on 3/6/2020.  - Repeat T&S on admission    # History of postpartum hemorrhage   # History of  delivery after PPROM  # History of VAVD  - Prior  vacuum assisted vaginal delivery complicated by postpartum hemorrhage. Hemorrhage of approximately 1 liter, received 1 unit of blood, and required D&C for management of retained placenta.  - Recommend Type & Cross on admission as well as uterotonics in the room for delivery    # Prenatal care:  - Prenatal labs:     - B positive, Ab screen negative, Hgb 15.0, Rubella immune, VDRL NR, HBsAg neg, HIV neg, Hgb A1c 5.0%, UCx negative   - Failed GCT (141). Passed GTT.  History of GDMA1   - GBS collected . Will follow up on results not available in EPIC  - Genetics: NIPT negative, AFP negative. Normal karyotype and microarray from amniocentesis  - Immunizations: S/p flu, Tdap    # Delivery Plan:  - S/p betamethasone at outside hospital (-). SVE 1 cm.   - Plan for induction of labor at 37weeks gestation, secondary to gestational hypertension in fetus with agenesis of the corpus callosum.    Wilian Pathak M.D.  Maternal Fetal Medicine

## 2020-07-06 NOTE — PROGRESS NOTES
"OBGYN Labor Progress Note    S: feeling comfortable. Questions about induction process.    O:   /57   Temp 97.4  F (36.3  C) (Oral)   Resp 16   Ht 1.651 m (5' 5\")   Wt 94.3 kg (208 lb)   LMP 10/18/2019   BMI 34.61 kg/m      Gen: NAD  Abd: soft, gravid, EFW 6.5lb by leopolds  SVE: 3/70/-2, soft, anterior   FHT: 140/mod/+accels/no decels  [place]: q6m    A/P:   33 y/o  at 37w3d here for IOL 2/2 gestational hypertension. Pregnancy c/b fetal CNS anomaly.    1. Induction of labor:   -Grover score 8, will proceed with oxytocin  -s/p BMTZ on -    2. H/o PPH 2/2 retained placenta:  -T&C x 2u ordered  -plan to have hemabate and misoprostol in the room; avoid methergine given gHTN    3. GHTN:  -HELLP labs wnl, UPC 0.11  -no symptoms of pre-eclampsia  -continue to monitor BP closely, treat urgently if BP >160/110    4. Anti-K antibody:  -has been too weak to titer during pregnancy. T&S with 2u on hold as above    5. Fetal CNS anomaly:  -absence of corpus callosum and colpocephalic ventriculomegaly. 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 Normal karyotype and microarray, normal fetal echo.  -will have neurology consultation after birth     6. History of VAVD for maternal fatigue    COVID screen ordered for admission.    Courtney Garcia MD, MSCI    [facility]/OBGYN  "

## 2020-07-06 NOTE — NURSING NOTE
Payal seen in clinic today for OB visit at 37w3d gestation for pregnancy complicated by fetal ventriculomegaly/absent CSP/corpus callosum (see report/notes). BPs/HR elevated today, see flowsheet. Pt reports positive fetal movement, FHR in 160s on doptone. Pt denies bldg/lof/change in discharge/contractions/headache/vision changes/chest pain/SOB/edema. Pt reports verbal report of negative GBS from [facility], will continue to request records of results. Rosaura Wilkes/Jose met with pt and discussed POC.  Plan for extended monitoring, labs in L&D triage, Dr. Wilkes notified L&D triage. Pt screens negative for new onset SOB/cough/fever. Follow up BP check scheduled for Thurs if discharged from hospital. Pt escorted ambulatory to L&D.

## 2020-07-06 NOTE — PROGRESS NOTES
Maternal fetal Medicine OB Follow up visit.     Payal Nunes  : 1985  MRN: [id number]    CC: OB Follow-up    Subjective:  Payal Nunes is a 34 year old  at 37w3d presenting for routine OB follow-up. Today, she states that she has been feeling fine. She denies any headache, changes in vision, chest pain, shortness of breath, RUQ pain, or other systemic symptoms. She also denies regular, painful contractions, denies loss of fluid or vaginal bleeding.  Reports fetal movement.      Objective:  BP (!) 139/93 (BP Location: Right arm, Patient Position: Sitting, Cuff Size: Adult Regular)   Pulse 117   Resp 18   Wt 94.5 kg (208 lb 4.8 oz)   LMP 10/18/2019   SpO2 96%   BMI 34.14 kg/m      Gen: NAD, well appearing  Chest: Normal rate  Pulm: Normal respiratory effort. Clear to auscultation bilaterally  Abdomen: Gravid, non-tender  Extremities: no edema, 2+ BLE reflexes    Assessment/Plan:  Payal Nunes is a 34 year old  at 37w3d by LMP consistent with 5wk US here for ob follow up d/t fetal CNS anomaly.     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 Findings consistent on fetal MRI performed on . Fetal MRI also showed question of unilateral left choanal atresia.  - S/p amniocentesis, which demonstrated normal karyotype and microarray.   - Infectious testing for HSV, toxo, and CMV were negative  - Growth today appropriate with EFW 2,741 g/6Ib 1oz (34%ile).  - S/p Fetal echo on 4/10 was within normal limits.  - S/p NICU consult with Dr. Sanches on   - S/p Social work consult on       # Elevated blood pressure  - Newly elevated blood pressures in clinictoday (150/95; 139/93)  - Sent to L&D for rule out pre-eclampsia (labs, blood pressure monitoring)     # Anti-K antibody  - Too weak to titer on 1/10/2020.  Repeat testing negative on 3/6/2020.  - Repeat T&C on admission d/t history of hemorrhage     # History of  postpartum hemorrhage   # History of  delivery after PPROM  # History of VAVD  - Prior  vacuum assisted vaginal delivery complicated by postpartum hemorrhage. Hemorrhage of approximately 1 liter, received 1 unit of blood, and required D&C for management of retained placenta.  - Recommend Type & Cross on admission as well as uterotonics in the room for delivery     # Prenatal care:  - Prenatal labs:                - B positive, Ab screen negative, Hgb 15.0, Rubella immune, VDRL NR, HBsAg neg, HIV neg, Hgb A1c 5.0%, UCx negative               - Failed GCT (141). Passed GTT.  History of GDMA1               - GBS negative on  at OSH.  - Genetics: NIPT negative, AFP negative. Normal karyotype and microarray from amniocentesis  - Immunizations: S/p flu, Tdap     # Delivery Plan:  - S/p betamethasone at outside hospital (-). SVE 1 cm.   - Plan for induction of labor at 39 weeks gestation, tentatively . Head circumference within normal limits, no contraindication to vaginal delivery. Will plan Covid testing as date approaches.     I acted as a scribe for Dr. Garcia  RTC later this week if not induced     Yvette Wilkes MD  Maternal Fetal Medicine Fellow  2020 8:57 AM      Attestation:   The documentation recorded by the scribe accurately reflects the services I personally performed and the decisions made by me.   Sandi Garcia, DO  Maternal Fetal Medicine Specialist         The patient was seen for an established outpatient visit.  I spent a total of 10 minutes face to face with Payal Nunes during today's visit. Over 50% of this time was spent counseling the patient and/or coordinating care fetus with ACC and bilateral ventriculomegaly.  New onset mild HTN today, patient sent to L&D for evaluation.

## 2020-07-06 NOTE — PLAN OF CARE
Pitocin started for labor induction. Patient is feeling comfortable. BP stable, denies headache, vision changes, epigastric pain. FHT appropriate, see flow record. Patient feeling occasional cramping with contractions, patient states she eventually wants epidural but is comfortable right now.

## 2020-07-07 PROBLEM — Z34.90 ENCOUNTER FOR INDUCTION OF LABOR: Status: ACTIVE | Noted: 2020-07-07

## 2020-07-07 LAB — HGB BLD-MCNC: 13.4 G/DL (ref 11.7–15.7)

## 2020-07-07 PROCEDURE — 25000132 ZZH RX MED GY IP 250 OP 250 PS 637: Performed by: STUDENT IN AN ORGANIZED HEALTH CARE EDUCATION/TRAINING PROGRAM

## 2020-07-07 PROCEDURE — 85018 HEMOGLOBIN: CPT | Performed by: STUDENT IN AN ORGANIZED HEALTH CARE EDUCATION/TRAINING PROGRAM

## 2020-07-07 PROCEDURE — 36415 COLL VENOUS BLD VENIPUNCTURE: CPT | Performed by: OBSTETRICS & GYNECOLOGY

## 2020-07-07 PROCEDURE — 10907ZC DRAINAGE OF AMNIOTIC FLUID, THERAPEUTIC FROM PRODUCTS OF CONCEPTION, VIA NATURAL OR ARTIFICIAL OPENING: ICD-10-PCS | Performed by: OBSTETRICS & GYNECOLOGY

## 2020-07-07 PROCEDURE — 36415 COLL VENOUS BLD VENIPUNCTURE: CPT | Performed by: STUDENT IN AN ORGANIZED HEALTH CARE EDUCATION/TRAINING PROGRAM

## 2020-07-07 PROCEDURE — 25000132 ZZH RX MED GY IP 250 OP 250 PS 637

## 2020-07-07 PROCEDURE — 12000001 ZZH R&B MED SURG/OB UMMC

## 2020-07-07 PROCEDURE — 25000128 H RX IP 250 OP 636: Performed by: STUDENT IN AN ORGANIZED HEALTH CARE EDUCATION/TRAINING PROGRAM

## 2020-07-07 PROCEDURE — 25000125 ZZHC RX 250: Performed by: STUDENT IN AN ORGANIZED HEALTH CARE EDUCATION/TRAINING PROGRAM

## 2020-07-07 PROCEDURE — 3E033VJ INTRODUCTION OF OTHER HORMONE INTO PERIPHERAL VEIN, PERCUTANEOUS APPROACH: ICD-10-PCS | Performed by: OBSTETRICS & GYNECOLOGY

## 2020-07-07 RX ORDER — NALBUPHINE HYDROCHLORIDE 20 MG/ML
2.5-5 INJECTION, SOLUTION INTRAMUSCULAR; INTRAVENOUS; SUBCUTANEOUS EVERY 6 HOURS PRN
Status: DISCONTINUED | OUTPATIENT
Start: 2020-07-07 | End: 2020-07-08 | Stop reason: HOSPADM

## 2020-07-07 RX ORDER — AMOXICILLIN 250 MG
1 CAPSULE ORAL 2 TIMES DAILY
Status: DISCONTINUED | OUTPATIENT
Start: 2020-07-07 | End: 2020-07-08 | Stop reason: HOSPADM

## 2020-07-07 RX ORDER — NALOXONE HYDROCHLORIDE 0.4 MG/ML
.1-.4 INJECTION, SOLUTION INTRAMUSCULAR; INTRAVENOUS; SUBCUTANEOUS
Status: DISCONTINUED | OUTPATIENT
Start: 2020-07-07 | End: 2020-07-08 | Stop reason: HOSPADM

## 2020-07-07 RX ORDER — NALOXONE HYDROCHLORIDE 0.4 MG/ML
.1-.4 INJECTION, SOLUTION INTRAMUSCULAR; INTRAVENOUS; SUBCUTANEOUS
Status: DISCONTINUED | OUTPATIENT
Start: 2020-07-07 | End: 2020-07-07

## 2020-07-07 RX ORDER — MISOPROSTOL 200 UG/1
400 TABLET ORAL
Status: COMPLETED | OUTPATIENT
Start: 2020-07-07 | End: 2020-07-07

## 2020-07-07 RX ORDER — IBUPROFEN 600 MG/1
600 TABLET, FILM COATED ORAL EVERY 6 HOURS PRN
Qty: 60 TABLET | Refills: 0 | Status: SHIPPED | OUTPATIENT
Start: 2020-07-07

## 2020-07-07 RX ORDER — HYDROCORTISONE 2.5 %
CREAM (GRAM) TOPICAL 3 TIMES DAILY PRN
Status: DISCONTINUED | OUTPATIENT
Start: 2020-07-07 | End: 2020-07-08 | Stop reason: HOSPADM

## 2020-07-07 RX ORDER — ACETAMINOPHEN 325 MG/1
650 TABLET ORAL EVERY 4 HOURS PRN
Status: DISCONTINUED | OUTPATIENT
Start: 2020-07-07 | End: 2020-07-07

## 2020-07-07 RX ORDER — OXYTOCIN 10 [USP'U]/ML
10 INJECTION, SOLUTION INTRAMUSCULAR; INTRAVENOUS
Status: DISCONTINUED | OUTPATIENT
Start: 2020-07-07 | End: 2020-07-08 | Stop reason: HOSPADM

## 2020-07-07 RX ORDER — SODIUM CHLORIDE, SODIUM LACTATE, POTASSIUM CHLORIDE, CALCIUM CHLORIDE 600; 310; 30; 20 MG/100ML; MG/100ML; MG/100ML; MG/100ML
INJECTION, SOLUTION INTRAVENOUS CONTINUOUS
Status: DISCONTINUED | OUTPATIENT
Start: 2020-07-07 | End: 2020-07-07

## 2020-07-07 RX ORDER — OXYTOCIN/0.9 % SODIUM CHLORIDE 30/500 ML
100-340 PLASTIC BAG, INJECTION (ML) INTRAVENOUS CONTINUOUS PRN
Status: DISCONTINUED | OUTPATIENT
Start: 2020-07-07 | End: 2020-07-06

## 2020-07-07 RX ORDER — IBUPROFEN 800 MG/1
800 TABLET, FILM COATED ORAL EVERY 6 HOURS PRN
Status: DISCONTINUED | OUTPATIENT
Start: 2020-07-07 | End: 2020-07-08 | Stop reason: HOSPADM

## 2020-07-07 RX ORDER — IBUPROFEN 800 MG/1
800 TABLET, FILM COATED ORAL
Status: DISCONTINUED | OUTPATIENT
Start: 2020-07-07 | End: 2020-07-07

## 2020-07-07 RX ORDER — OXYTOCIN 10 [USP'U]/ML
10 INJECTION, SOLUTION INTRAMUSCULAR; INTRAVENOUS
Status: DISCONTINUED | OUTPATIENT
Start: 2020-07-07 | End: 2020-07-07

## 2020-07-07 RX ORDER — AMOXICILLIN 250 MG
1 CAPSULE ORAL DAILY
Qty: 100 TABLET | Refills: 0 | Status: SHIPPED | OUTPATIENT
Start: 2020-07-07

## 2020-07-07 RX ORDER — OXYCODONE AND ACETAMINOPHEN 5; 325 MG/1; MG/1
1 TABLET ORAL
Status: DISCONTINUED | OUTPATIENT
Start: 2020-07-07 | End: 2020-07-07

## 2020-07-07 RX ORDER — BISACODYL 10 MG
10 SUPPOSITORY, RECTAL RECTAL DAILY PRN
Status: DISCONTINUED | OUTPATIENT
Start: 2020-07-09 | End: 2020-07-08 | Stop reason: HOSPADM

## 2020-07-07 RX ORDER — CARBOPROST TROMETHAMINE 250 UG/ML
250 INJECTION, SOLUTION INTRAMUSCULAR
Status: DISCONTINUED | OUTPATIENT
Start: 2020-07-07 | End: 2020-07-07

## 2020-07-07 RX ORDER — LIDOCAINE 40 MG/G
CREAM TOPICAL
Status: DISCONTINUED | OUTPATIENT
Start: 2020-07-07 | End: 2020-07-08 | Stop reason: HOSPADM

## 2020-07-07 RX ORDER — CEFAZOLIN SODIUM 1 G/3ML
1 INJECTION, POWDER, FOR SOLUTION INTRAMUSCULAR; INTRAVENOUS ONCE
Status: COMPLETED | OUTPATIENT
Start: 2020-07-07 | End: 2020-07-07

## 2020-07-07 RX ORDER — ACETAMINOPHEN 325 MG/1
650 TABLET ORAL EVERY 6 HOURS PRN
Qty: 100 TABLET | Refills: 0 | Status: SHIPPED | OUTPATIENT
Start: 2020-07-07

## 2020-07-07 RX ORDER — ONDANSETRON 2 MG/ML
4 INJECTION INTRAMUSCULAR; INTRAVENOUS EVERY 6 HOURS PRN
Status: DISCONTINUED | OUTPATIENT
Start: 2020-07-07 | End: 2020-07-07

## 2020-07-07 RX ORDER — OXYTOCIN/0.9 % SODIUM CHLORIDE 30/500 ML
100 PLASTIC BAG, INJECTION (ML) INTRAVENOUS CONTINUOUS
Status: DISCONTINUED | OUTPATIENT
Start: 2020-07-07 | End: 2020-07-08 | Stop reason: HOSPADM

## 2020-07-07 RX ORDER — EPHEDRINE SULFATE 50 MG/ML
5 INJECTION, SOLUTION INTRAMUSCULAR; INTRAVENOUS; SUBCUTANEOUS
Status: DISCONTINUED | OUTPATIENT
Start: 2020-07-07 | End: 2020-07-08 | Stop reason: HOSPADM

## 2020-07-07 RX ORDER — METHYLERGONOVINE MALEATE 0.2 MG/ML
200 INJECTION INTRAVENOUS
Status: DISCONTINUED | OUTPATIENT
Start: 2020-07-07 | End: 2020-07-07

## 2020-07-07 RX ORDER — CARBOPROST TROMETHAMINE 250 UG/ML
250 INJECTION, SOLUTION INTRAMUSCULAR
Status: COMPLETED | OUTPATIENT
Start: 2020-07-07 | End: 2020-07-07

## 2020-07-07 RX ORDER — AMOXICILLIN 250 MG
2 CAPSULE ORAL 2 TIMES DAILY
Status: DISCONTINUED | OUTPATIENT
Start: 2020-07-07 | End: 2020-07-08 | Stop reason: HOSPADM

## 2020-07-07 RX ORDER — MODIFIED LANOLIN
OINTMENT (GRAM) TOPICAL
Status: DISCONTINUED | OUTPATIENT
Start: 2020-07-07 | End: 2020-07-08 | Stop reason: HOSPADM

## 2020-07-07 RX ORDER — ACETAMINOPHEN 325 MG/1
650 TABLET ORAL EVERY 4 HOURS PRN
Status: DISCONTINUED | OUTPATIENT
Start: 2020-07-07 | End: 2020-07-08 | Stop reason: HOSPADM

## 2020-07-07 RX ORDER — OXYTOCIN/0.9 % SODIUM CHLORIDE 30/500 ML
340 PLASTIC BAG, INJECTION (ML) INTRAVENOUS CONTINUOUS PRN
Status: DISCONTINUED | OUTPATIENT
Start: 2020-07-07 | End: 2020-07-08 | Stop reason: HOSPADM

## 2020-07-07 RX ADMIN — IBUPROFEN 800 MG: 800 TABLET ORAL at 01:54

## 2020-07-07 RX ADMIN — ACETAMINOPHEN 650 MG: 325 TABLET, FILM COATED ORAL at 08:14

## 2020-07-07 RX ADMIN — MISOPROSTOL 400 MCG: 200 TABLET ORAL at 00:42

## 2020-07-07 RX ADMIN — ACETAMINOPHEN 650 MG: 325 TABLET, FILM COATED ORAL at 16:48

## 2020-07-07 RX ADMIN — DOCUSATE SODIUM 50 MG AND SENNOSIDES 8.6 MG 1 TABLET: 8.6; 5 TABLET, FILM COATED ORAL at 08:13

## 2020-07-07 RX ADMIN — CEFAZOLIN 1 G: 1 INJECTION, POWDER, FOR SOLUTION INTRAMUSCULAR; INTRAVENOUS at 01:09

## 2020-07-07 RX ADMIN — DOCUSATE SODIUM 50 MG AND SENNOSIDES 8.6 MG 2 TABLET: 8.6; 5 TABLET, FILM COATED ORAL at 20:00

## 2020-07-07 RX ADMIN — CARBOPROST TROMETHAMINE 250 MCG: 250 INJECTION, SOLUTION INTRAMUSCULAR at 01:18

## 2020-07-07 RX ADMIN — ACETAMINOPHEN 650 MG: 325 TABLET, FILM COATED ORAL at 13:02

## 2020-07-07 RX ADMIN — IBUPROFEN 800 MG: 800 TABLET ORAL at 20:01

## 2020-07-07 RX ADMIN — IBUPROFEN 800 MG: 800 TABLET ORAL at 08:14

## 2020-07-07 RX ADMIN — IBUPROFEN 800 MG: 800 TABLET ORAL at 14:10

## 2020-07-07 NOTE — PROGRESS NOTES
Post Partum Progress Note  PPD#1    Subjective:  She is resting comfortably in bed this morning. She has no complaints this morning. Pain is improving and well controlled on current medication regimen. She is tolerating PO intake. Lochia present and light, improved from immediately postpartum.  She is voiding without difficulty. She is ambulating without dizziness or difficulty.  She denies headache, changes in vision, nausea/vomiting, chest pain, shortness of breath, RUQ pain, or worsening edema.      Objective:  Patient Vitals for the past 24 hrs:   BP Temp Temp src Pulse Resp SpO2 Height Weight   07/07/20 0815 111/76 98  F (36.7  C) Oral 86 18 99 % -- --   07/07/20 0344 125/70 98.2  F (36.8  C) Oral 78 16 99 % -- --   07/07/20 0220 123/80 -- -- -- -- 97 % -- --   07/07/20 0205 115/79 -- -- -- -- 96 % -- --   07/07/20 0150 119/83 -- -- -- -- 97 % -- --   07/07/20 0135 113/76 -- -- -- -- 97 % -- --   07/07/20 0120 111/75 -- -- -- -- 97 % -- --   07/07/20 0105 108/77 -- -- -- -- 97 % -- --   07/07/20 0050 106/71 -- -- -- -- 97 % -- --   07/07/20 0036 113/63 -- -- -- -- 97 % -- --   07/07/20 0021 127/79 -- -- -- -- 97 % -- --   07/07/20 0006 130/65 -- -- -- -- 98 % -- --   07/06/20 2354 132/71 -- -- -- -- 97 % -- --   07/06/20 2351 126/75 -- -- -- -- -- -- --   07/06/20 2346 127/75 -- -- -- -- -- -- --   07/06/20 2341 133/79 -- -- -- -- -- -- --   07/06/20 2337 (!) 142/97 -- -- -- -- 97 % -- --   07/06/20 2336 (!) 142/97 -- -- -- -- -- -- --   07/06/20 2334 (!) 142/97 -- -- -- -- 96 % -- --   07/06/20 2333 122/83 -- -- -- -- -- -- --   07/06/20 2327 119/80 -- -- -- -- -- -- --   07/06/20 2322 118/62 -- -- -- -- -- -- --   07/06/20 2314 119/70 98  F (36.7  C) Oral -- -- -- -- --   07/06/20 2313 -- -- -- -- -- 97 % -- --   07/06/20 2312 113/67 -- -- -- -- -- -- --   07/06/20 2310 122/75 -- -- -- -- -- -- --   07/06/20 2308 122/77 -- -- -- -- 97 % -- --   07/06/20 2306 123/78 -- -- -- -- -- -- --   07/06/20 2304 123/79  "-- -- -- -- -- -- --   20 2302 130/72 -- -- -- -- -- -- --   20 2301 131/66 -- -- -- -- -- -- --   20 1941 116/77 98.1  F (36.7  C) Oral -- -- -- -- --   20 1810 105/64 -- -- -- 16 -- -- --   20 1639 111/69 -- -- -- 16 -- -- --   20 1500 119/57 97.4  F (36.3  C) Oral -- 16 -- -- --   20 1138 (!) 147/90 -- -- -- -- -- -- --   20 1122 128/78 -- -- -- -- -- -- --   20 1107 134/80 -- -- -- -- -- -- --   20 1053 125/75 -- -- -- -- -- -- --   20 1038 131/85 -- -- -- -- -- -- --   20 1024 137/83 -- -- -- -- -- -- --   20 1011 123/75 -- -- -- -- -- -- --   20 1007 123/75 -- -- -- -- -- -- --   20 0952 124/79 -- -- -- -- -- -- --   20 0938 -- 98.2  F (36.8  C) Oral -- 18 -- 1.651 m (5' 5\") 94.3 kg (208 lb)   20 0937 128/80 -- -- -- -- -- -- --   20 0922 134/86 -- -- -- -- -- -- --       General: NAD, resting comfortably  CV: Regular rate, well perfused.   Pulm: Normal respiratory effort.  Abd: Soft, non-tender, non-distended. Fundus is firm and 2 cm below the umbilicus.    Ext: No lower extremity edema bilaterally. No calf tenderness.    Assessment/Plan:  Payal Nunes is a 34 year old  female who is PPD#1 s/p , complicated by piece of retained placenta, no current bleeding concerns. Doing well postpartum.    # Gestational hypertension:  - HELLP labs normal on admission  - BP normal to low mild range  - Continue to monitor PP    # Postpartum:  - Encourage routine postpartum goals including ambulation and incentive spirometry  - PNC: Rh +. Rubella immune. No intervention indicated.  - Pain: controlled on oral medications  - Acute blood loss anemia: Hgb 13.3> (miso, hemabate, sweep/Ancef)>13.4. History of PPH, T&C x2 units. Bleeding currently stable and light.  - GI: continue anti-emetics and stool softeners as needed.  - : Voiding .  - Infant: Stable in room, NICU evaluated and planning neurology " evaluation due to known CNS anomaly (absence of corpus collosum).   - Feeding: Plans on breastfeeding.  - BC: undecided, plan to discuss at 6 weeks    Anticipate discharge to home PPD#2    Remedios Goddard MD, MPH  Obstetrics and Gyncology, PGY-3  July 7, 2020 , 8:29 AM     Appreciate Dr. Goddard's note above, patient also seen and examined by me. I agree with the note above.   Fernanda Blackwood MD

## 2020-07-07 NOTE — PROGRESS NOTES
"OBGYN Attending Progress Note    S: Contractions very uncomfortable. No breaks.    O:  /77   Temp 98.1  F (36.7  C) (Oral)   Resp 16   Ht 1.651 m (5' 5\")   Wt 94.3 kg (208 lb)   LMP 10/18/2019   BMI 34.61 kg/m    SVE: 6/100/0, BBOW  FHT: 135/moderate/+prolonged accelerations/no decels (some maternal heart rate with movement)  [place]: q2-3m    A/P:  33 y/o  at 37w3d here for IOL 2/2 gestational hypertension. Pregnancy c/b fetal CNS anomaly.     1. Induction of labor:   -continue oxytocin, will AROM after epidural  -GBS neg  -s/p BMTZ on -  -will give one time dose of IV fentanyl as patient waits for anesthesia availability for epidural     2. H/o PPH 2/2 retained placenta:  -T&C x 2u   -plan to have hemabate and misoprostol in the room; avoid methergine given gHTN     3. GHTN:  -HELLP labs wnl, UPC 0.11  -no symptoms of pre-eclampsia  -continue to monitor BP closely, treat urgently if BP >160/110     4. Anti-K antibody:  -has been too weak to titer during pregnancy. T&S with 2u on hold as above     5. Fetal CNS anomaly:  -absence of corpus callosum and colpocephalic ventriculomegaly. 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 Normal karyotype and microarray, normal fetal echo.  -NICU to evaluate at delivery  -will have neurology consultation after birth      6. History of VAVD for maternal fatigue     COVID NEG.     Will collect cord blood for banking with kit provided by patient.    Courtney Garcia MD, MSCI    [facility]  "

## 2020-07-07 NOTE — PROGRESS NOTES
"    Anesthesia Post-Partum Follow-Up Note After  with Epidural    Patient: Payal Nunes    Patient location: Post-partum floor.    Anesthesia type: Epidural    Subjective:     This interview was conducted with the assistance of a [place] .     She denies weakness, denies paresthesia, denies difficulties breathing or voiding, denies nausea or vomiting, and denies headache. She is able to ambulate and tolerates regular diet. Patient endorsed a positive anesthesia experience.    Objective:    Respiratory Function (RR / SpO2 / Airway Patency): Satisfactory    Cardiac Function (HR / Rhythm / BP): Satisfactory      Last Vitals: /76   Pulse 86   Temp 36.7  C (98  F) (Oral)   Resp 18   Ht 1.651 m (5' 5\")   Wt 94.3 kg (208 lb)   LMP 10/18/2019   SpO2 99%   Breastfeeding Unknown   BMI 34.61 kg/m      Assessment and plan:   Payal Nunes is a 34 year old female  post-partum #1 s/p  . An epidural catheter was successfully inserted at the level of L3-4 with mild technical challenges. This successfully provided labor analgesia via PCEA pump infusing Bupivacaine 0.125% with Fentanyl 2mcg/mL. The patient delivered via  and the epidural catheter was removed immediately thereafter by the L&D RN.     At this time, there is no evidence of adverse side effects associated with the insertion or removal of the epidural catheter. If the patient develops new lower extremity paresis or paresthesias; or if there are concerns regarding the insertion site of the catheter, please reach out to the Dept of Anesthesia.    Thank you for including us in the care for this patient.    Ondina NAVAS  Anesthesia Resident, PGY3          "

## 2020-07-07 NOTE — PROVIDER NOTIFICATION
07/07/20 0048   Provider Notification   Provider Name/Title Dr. Goddard   Method of Notification At Bedside   Request Evaluate in Person   Notification Reason Bleeding     Updated provider on bleeding status. Provider at bedside. Manual sweep performed. Will continue to support.

## 2020-07-07 NOTE — DISCHARGE SUMMARY
[facility] Discharge Summary    Payal Nunes MRN# [id number]   Age: 34 year old YOB: 1985     Date of Admission:  2020  Date of Discharge:  2020  Admitting Physician:  Courtney Garcia MD  Discharge Physician:  Mili White MD    Admit Dx:   - Intrauterine pregnancy at 37w3d   - Gestational hypertension  - Anti-K antibody  - Fetal CNS anomaly  - History of PPH  - History of PPROM/PTD  - History of VAVD    Discharge Dx:  - Same as above, s/p     Procedures:  - Spontaneous vaginal delivery  - Epidural analgesia    Admit HPI/Labor Course:  Payal Nunes is a 34 year old  at 37w3d who presented to L&D due to elevated blood pressure in clinic, meeting criteria for gestational hypertension. IOL was recommended. HELLP labs were normal and UPC 0.12. Patient's SVE on admission was 1 cm, she made change to 3 cm and was started on IV pitocin. FHT was Cat I for the labor course. She subsequently had an uncomplicated  of a liveborn female infant at 2343 on 2020 . The infant head was delivered in OA position. Apgars were 9 and 9 and 1 and 5 minutes. Weight 2830 g. Infant was then evaluated by NICU due to CNS anomaly with plan for neurology consult for . Cord blood was obtained for cord blood banking per 's recommendations. IV pitocin was started for active management of the third stage after cord blood obtained. The placenta was delivered with gentle downward traction. It was found to be intact with a three vessel cord. Uterine tone was firm. Her perineum was intact. Please see her Admission H&P and Delivery Summary for further details.    Postpartum Course:  I was called back to evaluate the patient within the first hour after delivery due to bleeding. BRINA sweep was performed and 3 cm piece of placenta removed from anterior uterine wall. Buccal misoprostol, hemabate, and Ancef were given. Bleeding improved signficantly. Upon final inspection,  there was good hemostasis and uterine tone was firm. Instrument and sharp count was correct. QBL: 334mL. Her postpartum course was otherwise uncomplicated. On PPD#2, she was meeting all of her postpartum goals and deemed stable for discharge. She was voiding without difficulty, tolerating a regular diet without nausea and vomiting, her pain was well controlled on oral pain medicines and her lochia was appropriate. Her hemoglobin prior to delivery was 13.3 and after delivery was 13.4. Her Rh status was positive, and Rhogam was not indicated.     Discharge Medications:     Review of your medicines      START taking      Dose / Directions   acetaminophen 325 MG tablet  Commonly known as:  TYLENOL      Dose:  650 mg  Take 2 tablets (650 mg) by mouth every 6 hours as needed for mild pain Start after Delivery.  Quantity:  100 tablet  Refills:  0     ibuprofen 600 MG tablet  Commonly known as:  ADVIL/MOTRIN      Dose:  600 mg  Take 1 tablet (600 mg) by mouth every 6 hours as needed for moderate pain Start after delivery  Quantity:  60 tablet  Refills:  0     senna-docusate 8.6-50 MG tablet  Commonly known as:  SENOKOT-S/PERICOLACE      Dose:  1 tablet  Take 1 tablet by mouth daily Start after delivery.  Quantity:  100 tablet  Refills:  0        CONTINUE these medicines which have NOT CHANGED      Dose / Directions   PRENATAL VITAMIN PO      Dose:  1 tablet  Take 1 tablet by mouth daily  Refills:  0           Where to get your medicines      These medications were sent to [facility] [place], [place] - [address]  [address] [address]    Phone:  [phone]     acetaminophen 325 MG tablet    ibuprofen 600 MG tablet    senna-docusate 8.6-50 MG tablet       Discharge/Disposition:  Payal Nunes was discharged to home in stable condition with the following instructions/medications:  1) Call for temperature > 100.4, bright red vaginal bleeding >1 pad an hour x 2 hours, foul smelling  vaginal discharge, pain not controlled by usual oral pain meds, persistent nausea and vomiting not controlled on medications  2) She was undecided for contraception  3) For feeding she decided to breastfeed.  4) She was instructed to follow-up with her primary OB in 6 weeks for a routine postpartum visit and 1 week for a blood pressure check.    Krysten Pierre MD  OB/GYN Resident, PGY-3  7/8/2020 6:57 AM     Staff MD Note    I evaluated the patient on the day of discharge.  We reviewed discharge instructions.  Patient stable for discharge.    Mili White MD

## 2020-07-07 NOTE — L&D DELIVERY NOTE
OB Vaginal Delivery Note    Payal Nunes MRN# [id number]   Age: 34 year old YOB: 1985     Delivery Note:  Payal Nunes is a 34 year old  at 37w3d who presented to L&D due to elevated blood pressure in clinic, meeting criteria for gestational hypertension. IOL was recommended. HELLP labs were normal and UPC 0.12. Pregnancy was complicated by: history of  delivery/PPROM, VAVD, and postpartum hemorrhage, fetal CNS anomaly, Anti-K antibody for which she was followed by MFM. Patient's SVE on admission was 1 cm, she made change to 3 cm and was started on IV pitocin. She received fentanyl and an epidural for pain. AROM was then performed at 8 cm and she quickly progressed to complete and pushed for approximately 5 minutes. FHT was Cat I for the labor course. She subsequently had an uncomplicated  of a liveborn female infant at 2343 on 2020 . The infant head was delivered in OA position. Apgars were 9 and 9 and 1 and 5 minutes. Weight 2830 g. The cord was allowed to pulse for 1 minute and was then clamped and cut. Infant was then evaluated by NICU due to CNS anomaly with plan for neurology consult for . Cord blood was obtained for cord blood banking per 's recommendations. IV pitocin was started for active management of the third stage after cord blood obtained. The placenta was delivered with gentle downward traction. It was found to be intact with a three vessel cord. Uterine tone was firm. Her perineum was intact.    I was called back into the room within the first hour after delivery due to bleeding. BRINA sweep was performed and 3 cm piece of placenta removed from anterior uterine wall. Buccal misoprostol, hemabate, and Ancef were given. Bleeding improved signficantly. Upon final inspection, there was good hemostasis and uterine tone was firm. Instrument and sharp count was correct. QBL: 334mL    Dr. Garcia was present for delivery    Remedios Goddard MD, MPH  OBGYN  PGY-3  2020 8:22 AM     GA: 37w3d  GP:   Labor Complications: None;Preeclampsia/Hypertension   Delivery QBL:  334 mL  Delivery Type: Vaginal, Spontaneous   ROM to Delivery Time: (Delivered) Hours: 1 Minutes: 23   Weight:     1 Minute 5 Minute 10 Minute   Apgar Totals: 9   9        GLDAYS SANCHEZ;BETO MILLS;EMILY MCADAMS TENLEY RAWLINGS     OBGYJESSIKA Attending Addendum    I, Emily Mcadams, was present during delivery of Payal Nunes and supervised the resident, Dr. Goddard, perform an .     Emily Mcadams MD, MSCI    [facility]/OBGYN  Date of Service: 2020

## 2020-07-07 NOTE — PLAN OF CARE
Pt VSS. Pt continued IOL process. Utilizing IV Oxytocin. Pt continued to get more uncomfortable with ctx. Plan of care discussed as pt verbalized wanting epidural before ROM. Plan made with pt to get epidural placed by . By  pt more uncomfortable. MDA unavailable at this time. D/t pt discomfort SVE was performed by provider, see flowsheet. Pt given IV Fentanyl x1. When MDA available, pt given epidural. Pt had decrease in pain level. SVE performed by provider and AROM was performed. After a few minutes post AROM, pt felt a lot of pressure. Pt was checked and was completely dilated. Pt began to push.  @ 2343. Placenta @ 2351. Post-partum Oxytocin utilized. Uterus firm, midline. Upon subsequent fundal checks pt having more clots. Pt was straight cathed and given PO Misoprostol. Provider updated that pt had episodes x2 of larger clots with constant trickle. Provider to bedside. Manual sweep performed by provider. Ancef ordered for prophylaxis. Pt uterus firm after this was performed, however, at a later exam pt had more clots and more of a trickle. Provider updated. Hemabate given per plan of care discussion. Pt uterus stable. Pt did have diarrhea after this medication was given. Uterus remained firm, midline and @ U/1. Ok to bring pt to [facility]. Pt transferred.

## 2020-07-07 NOTE — PROGRESS NOTES
Data: Payal Nuens transferred to [id number] via wheelchair at 0320. Baby transferred via parent's arms.  Action: Receiving unit notified of transfer: Yes. Patient and family notified of room change. Report given to Nayla CABRAL at 0345. Belongings sent to receiving unit. Accompanied by Registered Nurse. Oriented patient to surroundings. Call light within reach. ID bands double-checked with receiving RN.  Response: Patient tolerated transfer and is stable.

## 2020-07-07 NOTE — PLAN OF CARE
VSS. Postpartum assessment  WNL- see flow sheet. Light lochia rubra. Fundus firm, midline at U/1. Pain managed on ibuprofen and tylenol is available. Patient up ad dayne, voiding spontaneously, ambulating and performing self cares independently. Fluids and ambulation promoted. Patient using breastpump and formula feeding infant on cue.  is at the bedside. Patient and  bonding well with baby. Will continue to monitor and provide support as needed.

## 2020-07-07 NOTE — PROGRESS NOTES
Patient arrived to [facility] unit via wheelchair at 0330,with belongings, accompanied by spouse/ significant other, with infant in arms. Received report from Nani CABRAL and checked bands. Unit and room orientation started. Call light given; no concerns present at this time. Continue with plan of care.

## 2020-07-07 NOTE — PROGRESS NOTES
"    Anesthesia Post-Partum Follow-Up Note After  with Epidural    Patient: Payal Nunes    Patient location: Post-partum floor.    Anesthesia type: Epidural    Subjective:     She denies weakness, denies paresthesia, denies difficulties breathing or voiding, denies nausea or vomiting, and denies headache. She is able to ambulate and tolerates regular diet. Patient endorsed an okay anesthesia experience, she waited until her contractions were quite painful to ask for an epidural and at that time the anesthesia team was busy with a . She expressed understanding for the delay. In addition to this her epidural worked significantly better on one side.     Objective:    Respiratory Function (RR / SpO2 / Airway Patency): Satisfactory    Cardiac Function (HR / Rhythm / BP): Satisfactory      Last Vitals: /76   Pulse 86   Temp 36.7  C (98  F) (Oral)   Resp 18   Ht 1.651 m (5' 5\")   Wt 94.3 kg (208 lb)   LMP 10/18/2019   SpO2 99%   Breastfeeding Unknown   BMI 34.61 kg/m      Assessment and plan:   Payal Nunes is a 34 year old female  post-partum #1 s/p  . Her epidural successfully provided labor analgesia via PCEA pump infusing Bupivacaine 0.125% with Fentanyl 2mcg/mL. The patient delivered via  and the epidural catheter was removed immediately thereafter by the L&D RN.     At this time, there is no evidence of adverse side effects associated with the insertion or removal of the epidural catheter. If the patient develops new lower extremity paresis or paresthesias; or if there are concerns regarding the insertion site of the catheter, please reach out to the Dept of Anesthesia.    Thank you for including us in the care for this patient.    Ondina NAVAS  Anesthesia Resident, PGY3          "

## 2020-07-07 NOTE — PLAN OF CARE
VSS. Postpartum check WDL. Pain managed with Ibuprofen and Tylenol. Up ad dayne. Tolerating regular diet. Voiding without difficulty. Showered. Pumping independently. Seen by LC who reviewed hand expression with pt. EDS 0. BC turned in.  at bedside and supportive. Bonding well with baby. Continue cares.

## 2020-07-07 NOTE — LACTATION NOTE
This note was copied from a baby's chart.  Consult placed for mom desires to pump and bottle feed, not wish to put baby to breast.  Payal attempted breastfeeding with her first child in NICU with SNS feeding tubes and was not a good experience. She struggled with milk supply, never got a full (<3 ounce) bottle. She did not notice much change in breasts with first pregnancy but this time they did increase more in size.     Payal has pumping bra on and had already pumped several times prior to visit. Last time she did not learn hands on pumping so we practiced this together today.  Taught her how to do breast massage, hand expression and hands on pumping. Payal able to return demo getting a few drops hand expressed from each side. Encouraged watching Maximizing Milk video also and continue hands on pumping minimum of 8 but if able 10-12 times in first few days at least, then continue 8-12 times to support good supply. Gave her a pumping log for tracking and goal amounts, encouraged to follow up with lactation consultant at [facility] if not approaching goals after the first 7 days. Recommended hospital grade rental pump at discharge due to history of low supply and pump alone dependent on bringing milk in. Payal with check with her insurance on coverage and consider, will let RN know tomorrow. Offer support and encouragement, answered questions.

## 2020-07-07 NOTE — PROVIDER NOTIFICATION
07/07/20 0116   Provider Notification   Provider Name/Title Dr. Goddard   Method of Notification Phone   Request Evaluate - Remote   Notification Reason Bleeding     Discussed pt bleeding. Will give Hemabate.

## 2020-07-07 NOTE — PROVIDER NOTIFICATION
07/06/20 2230   Uterine Activity Assessment   Method external tocotransducer   Contraction Frequency (Minutes) 1.5-4   Contraction Duration (seconds) 40-70   Fetal Assessment   Fetal HR Assessment Method external US   Fetal HR (beats/min) 130   Fetal Heart Baseline Rate normal range   Fetal HR Variability minimal (detectable, amplitude less than or equal to 5 bpm)   Fetal HR Accelerations absent   Fetal HR Decelerations absent     Provider reviewed strip intra-department. No new orders.

## 2020-07-08 ENCOUNTER — TELEPHONE (OUTPATIENT)
Dept: MATERNAL FETAL MEDICINE | Facility: CLINIC | Age: 35
End: 2020-07-08

## 2020-07-08 VITALS
HEIGHT: 65 IN | DIASTOLIC BLOOD PRESSURE: 80 MMHG | HEART RATE: 86 BPM | WEIGHT: 208 LBS | OXYGEN SATURATION: 99 % | SYSTOLIC BLOOD PRESSURE: 115 MMHG | BODY MASS INDEX: 34.66 KG/M2 | TEMPERATURE: 97.9 F | RESPIRATION RATE: 20 BRPM

## 2020-07-08 PROCEDURE — 25000132 ZZH RX MED GY IP 250 OP 250 PS 637: Performed by: STUDENT IN AN ORGANIZED HEALTH CARE EDUCATION/TRAINING PROGRAM

## 2020-07-08 RX ADMIN — ACETAMINOPHEN 650 MG: 325 TABLET, FILM COATED ORAL at 05:10

## 2020-07-08 RX ADMIN — IBUPROFEN 800 MG: 800 TABLET ORAL at 02:19

## 2020-07-08 RX ADMIN — DOCUSATE SODIUM 50 MG AND SENNOSIDES 8.6 MG 2 TABLET: 8.6; 5 TABLET, FILM COATED ORAL at 08:19

## 2020-07-08 RX ADMIN — IBUPROFEN 800 MG: 800 TABLET ORAL at 08:19

## 2020-07-08 RX ADMIN — ACETAMINOPHEN 650 MG: 325 TABLET, FILM COATED ORAL at 00:25

## 2020-07-08 NOTE — PLAN OF CARE
Pt vital signs and assessment findings normal. Fundus firm, bleeding scant. Pt up ad dayne and able to care for self and baby. She is voiding and passing gas, awaiting BM. Pain managed with tylenol and ibuprofen. Videos complete. Pumping and obtaining drops followed by formula feeding with bottle 10mL. Bonding well with  and attentive to her cares. Continue plan of care.

## 2020-07-08 NOTE — PLAN OF CARE
Post partum assessment WNLs. Pain managed with Tylenol and Ibuprofen. Fundus film and midline. Lochia  light. Voiding without difficulty. Up ad dayne.  Pumping. Bottle feeding, using  expressed breast milk and formula. FOB at bedside, supportive. Family bonding as expected. Stable post partum mom, appears to be resting between feedings. Continue with plan of care.

## 2020-07-08 NOTE — TELEPHONE ENCOUNTER
7/8/2020    Payal emailed genetic counseling to inform us that she had delivered, and she and her daughter May Da Silva are expecting to be discharged 7/8.  She reports that peds neurology recommended a follow up for May in approximately 3 months.      [facility] Attending physician Dr. Pathak also contacted genetic counseling to discuss the possibility of coordinating a genetics consultation with our neurogenetics physician Dr. Marie.  Genetic counseling will reach out to pediatric genetics to begin coordinating this followup.     Evert Obrien MS, [facility]  Licensed Genetic Counselor  Phone: [phone]  Pager: [phone]

## 2020-07-08 NOTE — PLAN OF CARE
Data: Vital signs stable, assessments within normal limits. Discharge instruction given and instructed of signs/symptoms to look for and report per discharge instructions.   Discharge outcomes on care plan met. No apparent pain.   Action: Review of care plan, teaching, and discharge instructions done with patient. verification with signature obtained.   Response: patient  states understanding and comfort with self cares. All questions about self care addressed. patient discharged with infant  at 1140.

## 2020-07-08 NOTE — PROGRESS NOTES
Post Partum Progress Note  PPD#2    Subjective:  She is resting comfortably in bed this morning.  No complaints. Tolerating regular diet, voiding, passing flatus. Ambulating without dizziness. Light lochia. Pumping.     Objective:  Patient Vitals for the past 24 hrs:   BP Temp Temp src Pulse Heart Rate Resp SpO2   20 0025 129/86 97.6  F (36.4  C) Oral -- 85 18 --   20 1700 119/82 98  F (36.7  C) Oral -- 88 16 --   20 1300 (!) 125/91 97.8  F (36.6  C) Oral -- 86 18 --   20 0815 111/76 98  F (36.7  C) Oral 86 -- 18 99 %       General: NAD, resting comfortably  CV: Regular rate, well perfused.   Pulm: Normal respiratory effort.  Abd: Soft, non-tender, non-distended. Fundus is firm and 2 cm below the umbilicus.    Ext: No lower extremity edema bilaterally. No calf tenderness.    Assessment/Plan:  Payal Nunes is a 34 year old  female who is PPD#2 s/p , complicated by piece of retained placenta, no current bleeding concerns. Doing well postpartum.    # Gestational hypertension:  - HELLP labs normal on admission  - BP normal to low mild range  - Continue to monitor PP    # Postpartum:  - Encourage routine postpartum goals including ambulation and incentive spirometry  - PNC: Rh +. Rubella immune. No intervention indicated.  - Pain: controlled on oral medications  - Acute blood loss anemia: Hgb 13.3> (miso, hemabate, sweep/Ancef)>13.4. History of PPH, T&C x2 units. Bleeding currently stable and light.  - GI: continue anti-emetics and stool softeners as needed.  - : Voiding .  - Infant: Stable in room, NICU evaluated and planning neurology evaluation due to known CNS anomaly (absence of corpus collosum).   - Feeding: Plans on breastfeeding.  - BC: undecided, plan to discuss at 6 weeks    Anticipate discharge to home PPD#2, today    Remedios Goddrad MD, MPH  OBGYN PGY-3  2020 6:59 AM     Staff MD Note    I appreciate the note by Dr. Goddard.  Any necessary changes have been made by me.   I saw and evaluated the patient and agree with the findings and plan of care as documented in the note.    Mili White MD

## 2020-07-10 LAB
BLD PROD TYP BPU: NORMAL
BLD PROD TYP BPU: NORMAL
BLD UNIT ID BPU: 0
BLD UNIT ID BPU: 0
BLOOD PRODUCT CODE: NORMAL
BLOOD PRODUCT CODE: NORMAL
BPU ID: NORMAL
BPU ID: NORMAL
TRANSFUSION STATUS PATIENT QL: NORMAL

## 2020-07-12 LAB — COPATH REPORT: NORMAL

## 2020-07-15 ENCOUNTER — TELEPHONE (OUTPATIENT)
Dept: MATERNAL FETAL MEDICINE | Facility: CLINIC | Age: 35
End: 2020-07-15

## 2020-07-21 ENCOUNTER — TELEPHONE (OUTPATIENT)
Dept: MATERNAL FETAL MEDICINE | Facility: CLINIC | Age: 35
End: 2020-07-21

## 2020-07-23 LAB — LAB SCANNED RESULT: NORMAL

## 2021-01-04 ENCOUNTER — HEALTH MAINTENANCE LETTER (OUTPATIENT)
Age: 36
End: 2021-01-04

## 2021-01-25 ENCOUNTER — IMMUNIZATION (OUTPATIENT)
Dept: NURSING | Facility: CLINIC | Age: 36
End: 2021-01-25
Payer: COMMERCIAL

## 2021-01-25 PROCEDURE — 91300 PR COVID VAC PFIZER DIL RECON 30 MCG/0.3 ML IM: CPT

## 2021-01-25 PROCEDURE — 0001A PR COVID VAC PFIZER DIL RECON 30 MCG/0.3 ML IM: CPT

## 2021-02-15 ENCOUNTER — IMMUNIZATION (OUTPATIENT)
Dept: NURSING | Facility: CLINIC | Age: 36
End: 2021-02-15
Attending: INTERNAL MEDICINE
Payer: COMMERCIAL

## 2021-02-15 PROCEDURE — 0002A PR COVID VAC PFIZER DIL RECON 30 MCG/0.3 ML IM: CPT

## 2021-02-15 PROCEDURE — 91300 PR COVID VAC PFIZER DIL RECON 30 MCG/0.3 ML IM: CPT

## 2021-06-01 VITALS — WEIGHT: 191.5 LBS

## 2021-06-01 VITALS — WEIGHT: 191.4 LBS

## 2021-06-01 VITALS — WEIGHT: 193 LBS

## 2021-06-01 VITALS — BODY MASS INDEX: 31.82 KG/M2 | WEIGHT: 191 LBS | HEIGHT: 65 IN

## 2021-06-07 NOTE — TELEPHONE ENCOUNTER
Patient scheduled for MFM l2 on 4/3 at [facility].      Jeanette MARINO, [facility] .

## 2021-06-18 NOTE — PROGRESS NOTES
[facility] Gestational Diabetes Care Plan    Assessment: Enedina is here alone today for her follow-up.  She is doing well.  Stated adding in snacks and checks has been pretty easy, she has a hard time on days that are all surgery ensuring she can get a quick snack sometimes. Reviewed her readings.  All readings within range except 1 after dinner reading of 152 after late dinner at [employer].  Ketones have been negative.  She did have a few lower after meal readings, less than 100 with a 71 being the lowest.  Discussed hypoglycemia and increased risk when after meal readings are low.  Discussed signs and treatment.  All additional questions and concerns addressed.    Plan: Patient will follow-up in clinic in 3 weeks.  She will continue to monitor her ketones and blood sugars as discussed.  Discussed she should call before appointment if she has 3 high fasting readings in 7 days, 3 high unexplainable post meal readings in 7 days or 2 days of large/moderate ketones.  Patient agreed with plan.      Provider: DR. AGUEDA Corea/ JANETH Kim  Provider's Diagnosis (per referral form) Gestational Diabetes (648.83)    Weight: 193 lb (87.5 kg)  OGTT:   Results for orders placed or performed in visit on 05/29/18   Glucose, Gestational Challenge 2 Hour   Result Value Ref Range    Glucose, 2 Hour 160 (H) 70 - 154 mg/dL   Glucose, Gestational Challenge 1 Hour   Result Value Ref Range    Glucose, 1 Hour 188 (H) 70 - 179 mg/dL   Glucose, Gestational Challenge Fasting   Result Value Ref Range    Glucose, Fasting 80 70 - 94 mg/dL    Patient Fasting > 8hrs? Yes    Glucose, Gestational Challenge 3 Hour   Result Value Ref Range    Glucose, 3 hour 87 70 - 139 mg/dL     EDC: Estimated Date of Delivery: 8/10/18  Pregnancy number: 1  Previous GDM: No    Medications:   Current Outpatient Prescriptions:      acetone, urine, test Strp, Use 1 strip each morning to check ketones, Disp: 100 strip, Rfl: 1     blood glucose test (ACCU-CHEK GUIDE)  strips, Use 1 each As Directed 4 (four) times a day., Disp: 200 strip, Rfl: 2     generic lancets (ACCU-CHEK FASTCLIX), Use 1 needle four times daily to check blood sugar, Disp: 102 each, Rfl: 3     prenatal vitamin iron-folic acid 27mg-0.8mg (PRENATAL S) 27 mg iron- 800 mcg Tab tablet, Take 1 tablet by mouth daily., Disp: , Rfl:   PNV: Yes  Supplements: No    BG monitoring goals: Fasting <95; 1 hour post start of meal <140. Test 4 x per day.  Check fasting a.m. ketones: Yes  GDM meal pattern/carb counting taught per guidelines: Yes    Time: 30 minutes DSMT  Visit Type: GDM Individual Follow-up  Visit #: 2      Sandi Daniel  6/14/2018    DIABETES CARE PLAN AND EDUCATION RECORD    Gestational Diabetes Disease Process/Preconception Care/Management During Pregnancy/Postpartum:Assessed and Discussed    Meter (per above goals): Assessed and Discussed    Nutrition Management    Weight: Assessed and Discussed  Portions/Balance: Assessed and discussed  Carb ID/Count: Assessed and discussed  Label Reading: Assessed and discussed  Menu Planning: Assessed and Discussed  Dining Out: Assessed and Discussed  Physical Activity: Assessed and Discussed  Medications: Assessed and Discussed    Acute Complications: Prevent, Detect, Treat:      Hyperglycemia: Assessed and Discussed  Goal Setting and Problem Solving: Assessed and Discussed  Barriers: Assessed and Discussed  Psychosocial Adjustments: Assessed and Discussed

## 2021-06-18 NOTE — PROGRESS NOTES
[facility] GDM Care Plan    Assessment: Enedina is here alone for her initial education for Gestational Diabetes.  Stated she was shocked when she did not pass test.  Stated she feels her pregnancy has been good up until this.      Plan:    Provider: Dr. AGUEDA Corea/ Evelyn Kim CNP  Provider's Diagnosis (per referral form): Gestational (648.83)    Weight: 191 lb 6.4 oz (86.8 kg)  OGTT:   Results for orders placed or performed in visit on 18   Glucose, Gestational Challenge 2 Hour   Result Value Ref Range    Glucose, 2 Hour 160 (H) 70 - 154 mg/dL   Glucose, Gestational Challenge 1 Hour   Result Value Ref Range    Glucose, 1 Hour 188 (H) 70 - 179 mg/dL   Glucose, Gestational Challenge Fasting   Result Value Ref Range    Glucose, Fasting 80 70 - 94 mg/dL    Patient Fasting > 8hrs? Yes    Glucose, Gestational Challenge 3 Hour   Result Value Ref Range    Glucose, 3 hour 87 70 - 139 mg/dL     EDC: Estimated Date of Delivery: 8/10/18, having a boy  Pregnancy #: 1  Previous GDM: No  Medications:   Current Outpatient Prescriptions:      prenatal vitamin iron-folic acid 27mg-0.8mg (PRENATAL S) 27 mg iron- 800 mcg Tab tablet, Take 1 tablet by mouth daily., Disp: , Rfl:   Supplements: No  PNV: Yes  Meter: Accu-chek Guide  B after 2 eggs and denise bread with peanut butter   BG monitoring goals: Fasting <95; 1 hour post start of meal <140. Test 4 x per day.  Check fasting a.m. ketones: Yes  GDM meal pattern/carb counting taught per guidelines: Yes  Goals: Nutrition: GDM meal plan  Activity:  Walking after meals when able/staying active  Monitoring:  BG 4x/day as directed, ketones every morning    F/u in 1 week to assess BG and food log     DIABETES CARE PLAN AND EDUCATION RECORD    Gestational Diabetes Disease Process/Preconception Care/Management During Pregnancy/Postpartum:    Meter (per above goals): Discussed, Literature provided and Patient returned demonstration    Nutrition Management    Weight: Assessed, Discussed and  Literature provided  Portions/Balance: Assessed, Discussed and Literature provided  Carb ID/Count: Assessed, Discussed and Literature provided  Label Reading: Assessed, Discussed and Literature provided  Menu Planning: Assessed, Discussed and Literature provided  Dining Out: Assessed, Discussed and Literature provided  Physical Activity: Discussed and Literature provided    Acute Complications: Prevent, Detect, Treat:    Goal Setting and Problem Solving: Assessed and Discussed  Barriers: Assessed and Discussed  Psychosocial Adjustments: Assessed and Discussed      Time: 60  Visit Type: GDM Group  Visit #: 1

## 2021-06-19 NOTE — ANESTHESIA CARE TRANSFER NOTE
Last vitals:   Vitals:    07/06/18 0038   BP: 101/55   Pulse: 91   Resp: 17   Temp: 36.6  C (97.8  F)   SpO2: 100%     Patient's level of consciousness is awake  Spontaneous respirations: yes  Maintains airway independently: yes  Dentition unchanged: yes  Oropharynx: oropharynx clear of all foreign objects    QCDR Measures:  ASA# 20 - Surgical Safety Checklist: WHO surgical safety checklist completed prior to induction  PQRS# 430 - Adult PONV Prevention: 4558F-8P - Pt did NOT receive => 2 anti-emetic agents  ASA# 8 - Peds PONV Prevention: NA - Not pediatric patient, not GA or 2 or more risk factors NOT present  PQRS# 424 - Swapna-op Temp Management: 4559F - At least one body temp DOCUMENTED => 35.5C or 95.9F within required timeframe  PQRS# 426 - PACU Transfer Protocol: - Transfer of care checklist used  ASA# 14 - Acute Post-op Pain: ASA14B - Patient did NOT experience pain >= 7 out of 10

## 2021-06-19 NOTE — ANESTHESIA PREPROCEDURE EVALUATION
Anesthesia Evaluation      Patient summary reviewed   No history of anesthetic complications     Airway   Mallampati: II  Neck ROM: full   Pulmonary - negative ROS and normal exam    breath sounds clear to auscultation                         Cardiovascular - negative ROS and normal exam  Exercise tolerance: > or = 4 METS  Rhythm: regular  Rate: normal,         Neuro/Psych - negative ROS     Endo/Other    (+) pregnant     GI/Hepatic/Renal - negative ROS      Other findings: Retained placenta-Hgb-9.8, EBL with delivery 400 cc      Dental - normal exam                        Anesthesia Plan  Planned anesthetic: epidural    ASA 2     Anesthetic plan and risks discussed with: patient    Post-op plan: routine recovery

## 2021-06-19 NOTE — PROGRESS NOTES
[facility] Gestational Diabetes Care Plan    Assessment: Enedina is here alone for her follow-up today.  She is doing great.  All her readings have been within ranges and ketones have been negative-trace.  All additional questions and concerns addressed.    Plan: Today will be patient's last visit.  Discussed continuing to check glucose 4 times a day and following meal plan until the day of delivery.    After you deliver the baby, it is suggested to check your blood sugar occasionally (1 - 2 times per week) for 6 weeks.   When you are not pregnant, normal blood sugars are:     Fasting (before eating anything in the morning)  - under 100 mg/dl  2 hours after meals under 140  The American Diabetes Association recommends:   a glucose tolerance test 6 weeks after you deliver the baby.      a hemoglobin Alc test yearly after delivery.  The Alc test is a 2-3 month average blood sugar reading.       Discuss getting these tests with your provider.    After delivery, and your provider has said that it is safe to be active, work toward getting 150 minutes each week of physical activity to decrease your risk of  getting type 2 diabetes.    Maintaining a healthy body weight will also decrease your risk of getting type 2 diabetes.       Provider: Dr. AGUEDA Corea  Provider's Diagnosis (per referral form) Gestational Diabetes (648.83)    Weight: 191 lb 8 oz (86.9 kg)  OGTT:   Results for orders placed or performed in visit on 05/29/18   Glucose, Gestational Challenge 2 Hour   Result Value Ref Range    Glucose, 2 Hour 160 (H) 70 - 154 mg/dL   Glucose, Gestational Challenge 1 Hour   Result Value Ref Range    Glucose, 1 Hour 188 (H) 70 - 179 mg/dL   Glucose, Gestational Challenge Fasting   Result Value Ref Range    Glucose, Fasting 80 70 - 94 mg/dL    Patient Fasting > 8hrs? Yes    Glucose, Gestational Challenge 3 Hour   Result Value Ref Range    Glucose, 3 hour 87 70 - 139 mg/dL     EDC: Estimated Date of Delivery:  8/10/18  Pregnancy number: 1  Previous GDM: No    Medications:   Current Outpatient Prescriptions:      acetone, urine, test Strp, Use 1 strip each morning to check ketones, Disp: 100 strip, Rfl: 1     blood glucose test (ACCU-CHEK GUIDE) strips, Use 1 each As Directed 4 (four) times a day., Disp: 200 strip, Rfl: 2     generic lancets (ACCU-CHEK FASTCLIX), Use 1 needle four times daily to check blood sugar, Disp: 102 each, Rfl: 3     prenatal vitamin iron-folic acid 27mg-0.8mg (PRENATAL S) 27 mg iron- 800 mcg Tab tablet, Take 1 tablet by mouth daily., Disp: , Rfl:   PNV: Yes  Supplements: No    BG monitoring goals: Fasting <95; 1 hour post start of meal <140. Test 4 x per day.  Check fasting a.m. ketones: Yes  GDM meal pattern/carb counting taught per guidelines: Yes    Time: 30 minutes DSMT  Visit Type: GDM Individual Follow-up  Visit #: 3      Sandi Daniel  7/5/2018    DIABETES CARE PLAN AND EDUCATION RECORD    Gestational Diabetes Disease Process/Preconception Care/Management During Pregnancy/Postpartum:Assessed and Discussed    Meter (per above goals): Assessed and Discussed    Nutrition Management    Weight: Assessed and Discussed  Portions/Balance: Assessed and discussed  Carb ID/Count: Assessed and discussed  Label Reading: Assessed and discussed  Menu Planning: Assessed and Discussed  Dining Out: Assessed and Discussed  Physical Activity: Assessed and Discussed  Medications: Assessed and Discussed    Acute Complications: Prevent, Detect, Treat:      Hyperglycemia: Assessed and Discussed  Goal Setting and Problem Solving: Assessed and Discussed  Barriers: Assessed and Discussed  Psychosocial Adjustments: Assessed and Discussed

## 2021-06-19 NOTE — ANESTHESIA PROCEDURE NOTES
Epidural Block    Patient location during procedure: OB  Time Called: 7/5/2018 4:52 PM  Reason for Block:labor epidural  Staffing:  Performing  Anesthesiologist: ART HAWKINS  Preanesthetic Checklist  Completed: patient identified, risks, benefits, and alternatives discussed, timeout performed, consent obtained, at patient's request, airway assessed, oxygen available, suction available, emergency drugs available and hand hygiene performed  Procedure  Patient position: sitting  Prep: ChloraPrep  Patient monitoring: continuous pulse oximetry, heart rate and blood pressure  Approach: midline  Location: L3-L4  Injection technique: DEWAYNE saline  Number of Attempts:1  Needle  Needle type: Adonis   Needle gauge: 18 G     Catheter in Space: 3  Assessment  Sensory level: T10  No complications      Additional Notes:  candy well

## 2021-06-19 NOTE — ANESTHESIA POSTPROCEDURE EVALUATION
Patient: Rigo Kat  REMOVAL, RETAINED PLACENTA  Anesthesia type: epidural    Patient location: Labor and Delivery  Last vitals:   Vitals:    07/06/18 0715   BP:    Pulse: (!) 105   Resp:    Temp:    SpO2: 100%     Post vital signs: stable  Level of consciousness: awake and responds to simple questions  Post-anesthesia pain: pain controlled  Post-anesthesia nausea and vomiting: no  Pulmonary: unassisted, return to baseline  Cardiovascular: stable and blood pressure at baseline  Hydration: adequate  Anesthetic events: no    QCDR Measures:  ASA# 11 - Swapna-op Cardiac Arrest: ASA11B - Patient did NOT experience unanticipated cardiac arrest  ASA# 12 - Swapna-op Mortality Rate: ASA12B - Patient did NOT die  ASA# 13 - PACU Re-Intubation Rate: NA - No ETT / LMA used for case  ASA# 10 - Composite Anes Safety: ASA10A - No serious adverse event    Additional Notes:    Results for RIGO KAT (MRN [id number]) as of 7/6/2018 08:35   Ref. Range 7/6/2018 08:00   INR Latest Ref Range: 0.90 - 1.10  1.17 (H)   Hemoglobin Latest Ref Range: 12.0 - 16.0 g/dL 8.9 (L)   Platelets Latest Ref Range: 140 - 440 thou/uL 190       Epidural catheter pulled. Tip intact.

## 2021-07-03 NOTE — ADDENDUM NOTE
Addendum Note by Fuad Savage, Diabetes Ed at 6/7/2018  9:52 AM     Author: Fuad Savage, Diabetes Ed Service: -- Author Type: Diabetes Ed    Filed: 6/7/2018  9:52 AM Encounter Date: 6/7/2018 Status: Signed    : Fuad Savage, Diabetes Ed (Diabetes Ed)    Addended by: FUAD SAVAGE on: 6/7/2018 09:52 AM        Modules accepted: Orders

## 2021-10-08 ENCOUNTER — IMMUNIZATION (OUTPATIENT)
Dept: NURSING | Facility: CLINIC | Age: 36
End: 2021-10-08
Payer: COMMERCIAL

## 2021-10-08 PROCEDURE — 91300 PR COVID VAC PFIZER DIL RECON 30 MCG/0.3 ML IM: CPT

## 2021-10-08 PROCEDURE — 0004A PR COVID VAC PFIZER DIL RECON 30 MCG/0.3 ML IM: CPT

## 2021-10-10 ENCOUNTER — HEALTH MAINTENANCE LETTER (OUTPATIENT)
Age: 36
End: 2021-10-10

## 2022-01-30 ENCOUNTER — HEALTH MAINTENANCE LETTER (OUTPATIENT)
Age: 37
End: 2022-01-30

## 2022-07-22 ENCOUNTER — LAB REQUISITION (OUTPATIENT)
Dept: LAB | Facility: CLINIC | Age: 37
End: 2022-07-22

## 2022-07-22 DIAGNOSIS — Z13.220 ENCOUNTER FOR SCREENING FOR LIPOID DISORDERS: ICD-10-CM

## 2022-07-22 DIAGNOSIS — Z13.1 ENCOUNTER FOR SCREENING FOR DIABETES MELLITUS: ICD-10-CM

## 2022-07-22 DIAGNOSIS — Z13.29 ENCOUNTER FOR SCREENING FOR OTHER SUSPECTED ENDOCRINE DISORDER: ICD-10-CM

## 2022-07-22 PROCEDURE — 84443 ASSAY THYROID STIM HORMONE: CPT | Performed by: NURSE PRACTITIONER

## 2022-07-22 PROCEDURE — 80061 LIPID PANEL: CPT | Performed by: NURSE PRACTITIONER

## 2022-07-22 PROCEDURE — 83036 HEMOGLOBIN GLYCOSYLATED A1C: CPT | Performed by: NURSE PRACTITIONER

## 2022-07-23 LAB
CHOLEST SERPL-MCNC: 147 MG/DL
HDLC SERPL-MCNC: 48 MG/DL
LDLC SERPL CALC-MCNC: 91 MG/DL
NONHDLC SERPL-MCNC: 99 MG/DL
TRIGL SERPL-MCNC: 40 MG/DL
TSH SERPL DL<=0.005 MIU/L-ACNC: 1.02 UIU/ML (ref 0.3–4.2)

## 2022-07-24 LAB — HBA1C MFR BLD: 5.2 %

## 2022-09-24 ENCOUNTER — HEALTH MAINTENANCE LETTER (OUTPATIENT)
Age: 37
End: 2022-09-24

## 2023-01-10 DIAGNOSIS — Z84.89 FAMILY HISTORY OF GENETIC DISORDER: Primary | ICD-10-CM

## 2023-01-10 NOTE — PROGRESS NOTES
Payal will be participating as a family member in exome sequencing for her daughter, May ([id number]).  Please refer to their chart for additional details.     Verbal informed consent was obtained on 1/2/2023 after reviewing the risks, benefits, and limitations of participating in exome sequencing as a family member    Vision on secondary findings to be determined.    Natalie Cota MS [facility]  Genetic Counselor  Email: [email or web address]  Phone: [phone]  Pager: [phone]

## 2023-05-08 ENCOUNTER — HEALTH MAINTENANCE LETTER (OUTPATIENT)
Age: 38
End: 2023-05-08

## 2023-05-26 ENCOUNTER — LAB (OUTPATIENT)
Dept: LAB | Facility: CLINIC | Age: 38
End: 2023-05-26
Payer: COMMERCIAL

## 2023-05-26 DIAGNOSIS — Z84.89 FAMILY HISTORY OF GENETIC DISORDER: ICD-10-CM

## 2023-08-25 LAB
SIGNIFICANT RESULTS: NORMAL
SPECIMEN DESCRIPTION: NORMAL

## 2023-10-08 ENCOUNTER — HEALTH MAINTENANCE LETTER (OUTPATIENT)
Age: 38
End: 2023-10-08

## 2024-12-07 ENCOUNTER — HEALTH MAINTENANCE LETTER (OUTPATIENT)
Age: 39
End: 2024-12-07

## 2025-07-18 ENCOUNTER — LAB REQUISITION (OUTPATIENT)
Dept: LAB | Facility: CLINIC | Age: 40
End: 2025-07-18

## 2025-07-18 DIAGNOSIS — Z12.4 ENCOUNTER FOR SCREENING FOR MALIGNANT NEOPLASM OF CERVIX: ICD-10-CM

## 2025-07-18 PROCEDURE — G0145 SCR C/V CYTO,THINLAYER,RESCR: HCPCS | Performed by: NURSE PRACTITIONER

## 2025-07-18 PROCEDURE — 87624 HPV HI-RISK TYP POOLED RSLT: CPT | Performed by: NURSE PRACTITIONER

## 2025-07-21 LAB
HPV HR 12 DNA CVX QL NAA+PROBE: NEGATIVE
HPV16 DNA CVX QL NAA+PROBE: NEGATIVE
HPV18 DNA CVX QL NAA+PROBE: NEGATIVE
HUMAN PAPILLOMA VIRUS FINAL DIAGNOSIS: NORMAL

## 2025-07-23 LAB
BKR AP ASSOCIATED HPV REPORT: NORMAL
BKR LAB AP GYN ADEQUACY: NORMAL
BKR LAB AP GYN INTERPRETATION: NORMAL
BKR LAB AP LMP: NORMAL
BKR LAB AP PREVIOUS ABNL DX: NORMAL
BKR LAB AP PREVIOUS ABNORMAL: NORMAL
PATH REPORT.COMMENTS IMP SPEC: NORMAL
PATH REPORT.COMMENTS IMP SPEC: NORMAL
PATH REPORT.RELEVANT HX SPEC: NORMAL